# Patient Record
Sex: MALE | Race: WHITE | Employment: OTHER | URBAN - METROPOLITAN AREA
[De-identification: names, ages, dates, MRNs, and addresses within clinical notes are randomized per-mention and may not be internally consistent; named-entity substitution may affect disease eponyms.]

---

## 2018-03-02 ENCOUNTER — OFFICE VISIT (OUTPATIENT)
Dept: SURGERY | Age: 73
End: 2018-03-02

## 2018-03-02 VITALS
HEIGHT: 66 IN | TEMPERATURE: 97.9 F | RESPIRATION RATE: 15 BRPM | WEIGHT: 203 LBS | SYSTOLIC BLOOD PRESSURE: 117 MMHG | BODY MASS INDEX: 32.62 KG/M2 | OXYGEN SATURATION: 98 % | HEART RATE: 90 BPM | DIASTOLIC BLOOD PRESSURE: 73 MMHG

## 2018-03-02 DIAGNOSIS — L72.3 INFECTED SEBACEOUS CYST OF SKIN: ICD-10-CM

## 2018-03-02 DIAGNOSIS — L08.9 INFECTED SEBACEOUS CYST OF SKIN: ICD-10-CM

## 2018-03-02 RX ORDER — WARFARIN 2.5 MG/1
2.5 TABLET ORAL
Status: ON HOLD | COMMUNITY
End: 2020-11-30

## 2018-03-02 RX ORDER — ASPIRIN 81 MG/1
81 TABLET ORAL DAILY
COMMUNITY

## 2018-03-02 NOTE — PROGRESS NOTES
Surgery History and Physical    Subjective:      Tish Payan is a 74 y. o. white male who presents for evaluation of a cyst.  Mr. Rosalinda Briones has had a cyst on his back for years. About 8 months ago, the cyst increased in size. About 2 weeks ago, the cyst became red and painful. He denies any drainage or fever. His blood sugars have been fine. [facility] was [facility] yesterday and started on Doxycycline. He denies any previous skin infections, but believes that he acquired MRSA when he had his bladder surgery. Past Medical History:   Diagnosis Date    Arrhythmia     frequent ectopic beats    Arthritis     Cancer [facility]) 1999    bladder    Carotid atherosclerosis 2/22/2011    Chronic obstructive pulmonary disease (HCC)     Chronic pain     left shoulder    CKD (chronic kidney disease)     Stage 3    GERD (gastroesophageal reflux disease)     Hypercholesterolemia     Hyperlipidemia.     Hypertension     Ill-defined condition     hyperlipidemia    NIDDM (non-insulin dependent diabetes mellitus)     [facility]peripheral artery [facility]) [facility])     Unspecified sleep apnea     non compliant with CPAP     Past Surgical History:   Procedure Laterality Date    CYSTECTOMY,ILEAL CONDUIT/SIGMOID BLADDER  1999    HX CATARACT REMOVAL Bilateral     HX KNEE ARTHROSCOPY Left     repair torn meniscus    HX ORTHOPAEDIC Left     remove heel spur    HX OTHER SURGICAL Left 2011    remove benign growth on neck    HX SHOULDER REPLACEMENT Left 2012    VASCULAR SURGERY PROCEDURE UNLIST Bilateral     BLE stents      Family History   Problem Relation Age of Onset    Heart Disease Mother     Cancer Father      rectal    Cancer Sister      colon    Diabetes Brother      Social History   Substance Use Topics    Smoking status: Former Smoker     Packs/day: 1.00     Years: 40.00     Quit date: 5/4/1999    Smokeless tobacco: Never Used    Alcohol use Yes      Comment: vvery rare beer      Prior to Admission medications Medication Sig Start Date End Date Taking? Authorizing Provider   warfarin (COUMADIN) 2.5 mg tablet Take 2.5 mg by mouth daily. Yes Historical Provider   aspirin delayed-release 81 mg tablet Take  by mouth daily. Yes Historical Provider   BUMETANIDE (BUMEX PO) Take 1 mg by mouth daily. Yes Historical Provider   lisinopril (PRINIVIL, ZESTRIL) 5 mg tablet Take 5 mg by mouth daily. Yes Historical Provider   insulin NPH (HUMULIN N) 100 unit/mL injection 45 Units by SubCUTAneous route nightly. Yes Historical Provider   insulin NPH (HUMULIN N) 100 unit/mL injection 50 Units by SubCUTAneous route Daily (before breakfast). Yes Historical Provider   sodium bicarbonate 650 mg tablet Take 1,950 mg by mouth daily. Yes Historical Provider   Cholecalciferol, Vitamin D3, (VITAMIN D3) 1,000 unit cap Take 1 Cap by mouth daily. Yes Historical Provider   psyllium (METAMUCIL) 0.52 gram capsule Take 2 Caps by mouth daily. Yes Historical Provider   Omeprazole delayed release (PRILOSEC D/R) 20 mg tablet Take 20 mg by mouth daily. Yes Historical Provider   vit B Cmplx 3-FA-Vit C-Biotin (NEPHRO SHIVANI RX) 1- mg-mg-mcg tablet Take 1 Tab by mouth daily. Yes Historical Provider   simvastatin (ZOCOR) 40 mg tablet Take 40 mg by mouth nightly. Yes Historical Provider   INSULIN LISPRO (HUMALOG SC) 15-20 Units by SubCUTAneous route Before breakfast, lunch, and dinner. 15 units morning and noon, 20 units with dinner   Yes Historical Provider   oxyCODONE-acetaminophen (PERCOCET) 5-325 mg per tablet Take 1-2 Tabs by mouth every four (4) hours as needed for Pain. Max Daily Amount: 12 Tabs. 5/13/15   Fernando Walls NP   promethazine (PHENERGAN) 25 mg tablet Take 1 Tab by mouth every six (6) hours as needed for Nausea. 5/13/15   Fernando Walls NP   diazepam (VALIUM) 5 mg tablet Take 1 Tab by mouth every six (6) hours as needed for Anxiety.  Max Daily Amount: 20 mg. 5/13/15   Fernando Walls NP   gabapentin (NEURONTIN) 300 mg capsule Take 300 mg by mouth nightly. Historical Provider   Fenofibrate 54 mg Tab Take 54 mg by mouth daily. Historical Provider      No Known Allergies    Review of Systems:  A comprehensive review of systems was negative except for that written in the History of Present Illness. Objective:      Physical Exam:  GENERAL: alert, cooperative, no distress, appears stated age, EYE: negative findings: anicteric sclera, LYMPHATIC: Cervical, supraclavicular, and axillary nodes normal. , THROAT & NECK: neck supple and symmetrical.  The thyroid is grossly normal., LUNG: clear to auscultation bilaterally, HEART: regular rate and rhythm, ABDOMEN: Soft, NT, ND., EXTREMITIES:  no edema, SKIN: Along the right upper back, there is an approximately 4 x 4 cm raised, firm, tender, well-circumscribed, superficial cystic mass. There is mild erythema, bt no fluctuance or drainage., NEUROLOGIC: negative, PSYCHIATRIC: non focal    Assessment:     Infected sebaceous cyst of the right upper back. Plan:     Mr. Elke De does not appear to have an abscess at this time requiring drainage. He would like to have the cyst removed though. He will need to be off his Coumadin for at least 5 days, and this will need to be discussed with his cardiologist.  I discussed the risks of the procedure including bleeding, persistent infection, wound healing problems, recurrence of the cyst, seroma, and reaction to the prep or local anesthetic. He understands the risks; any and all questions were answered to his satisfaction. Mr. Elke De will be scheduled for an elective outpatient excision of this cyst of the right upper back under MAC with local anesthesia. He will continue his present antibiotics until that time.     Signed By: Blake Lawler MD     March 2, 2018

## 2018-03-02 NOTE — PROGRESS NOTES
1. Have you been to the ER, urgent care clinic since your last visit? Hospitalized since your last visit? [facility]     2. Have you seen or consulted any other health care providers outside of the [address] [place] [address] since your last visit? Include any pap smears or colon screening.  no

## 2018-03-09 ENCOUNTER — TELEPHONE (OUTPATIENT)
Dept: SURGERY | Age: 73
End: 2018-03-09

## 2018-03-09 NOTE — TELEPHONE ENCOUNTER
Called to inform patient I have called to get in contact with Dr Zeke Schmitz since last week, his nurse said he was on vacation but she will call me back with orders for the patient to stop coumadin. Patient said he also called the office and they informed him the Dr will not be back into the office until Monday but they also informed him that I called regarding his coumadin. I will call back the office on Monday. Patient also states his cyst is now open and draining but he is aware Dr Aiden Daugherty will not do an office procedure also until he is off the coumadin for atleast 5 days. He is currently taking antibiotics.

## 2018-03-12 ENCOUNTER — TELEPHONE (OUTPATIENT)
Dept: SURGERY | Age: 73
End: 2018-03-12

## 2018-03-13 ENCOUNTER — OFFICE VISIT (OUTPATIENT)
Dept: SURGERY | Age: 73
End: 2018-03-13

## 2018-03-13 VITALS
SYSTOLIC BLOOD PRESSURE: 118 MMHG | DIASTOLIC BLOOD PRESSURE: 59 MMHG | HEIGHT: 66 IN | WEIGHT: 202 LBS | HEART RATE: 100 BPM | RESPIRATION RATE: 15 BRPM | BODY MASS INDEX: 32.47 KG/M2 | OXYGEN SATURATION: 97 % | TEMPERATURE: 97.9 F

## 2018-03-13 DIAGNOSIS — L72.3 INFECTED SEBACEOUS CYST: ICD-10-CM

## 2018-03-13 DIAGNOSIS — L08.9 INFECTED SEBACEOUS CYST: ICD-10-CM

## 2018-03-13 DIAGNOSIS — L02.212 CUTANEOUS ABSCESS OF BACK EXCLUDING BUTTOCKS: Primary | ICD-10-CM

## 2018-03-13 NOTE — PROGRESS NOTES
Subjective:      Donna Muro is a 74 y. o. white male presents for f/u for an infected cyst.  Mr. Miryam Mata is still having some pain in his back. The cyst has started to drain pus and now feels better. He denies any fever. He has completed his antibiotics. Objective:     Visit Vitals    /59 (BP 1 Location: Left arm, BP Patient Position: Sitting)    Pulse 100    Temp 97.9 °F (36.6 °C) (Oral)    Resp 15    Ht 5' 6\" (1.676 m)    Wt 202 lb (91.6 kg)    SpO2 97%    BMI 32.6 kg/m2       General:  alert, cooperative, no distress   Right upper back: There is an approximately 5 x 4 cm raised, cystic mass with fluctuance, mild erythema and tenderness. There is no drainage. Assessment:     1. Abscess of the right upper back. 2. Infected cyst of the right upper back. Plan:     Mr. Miryam Mata has been cleared by his cardiologist to stop his Coumadin. He will stop te Coumadin tomorrow and be scheduled on Monday, 3/19 for an elective outpatient excision of this cyst and drainage of abscess of the right upper back under MAC with local anesthesia. The risks have already been discussed with him.

## 2018-03-13 NOTE — PROGRESS NOTES
1. Have you been to the ER, urgent care clinic since your last visit? Hospitalized since your last visit?no    2. Have you seen or consulted any other health care providers outside of the [address] [place] [address] since your last visit? Include any pap smears or colon screening.  no

## 2018-03-14 ENCOUNTER — HOSPITAL ENCOUNTER (OUTPATIENT)
Dept: PREADMISSION TESTING | Age: 73
Discharge: HOME | End: 2018-03-14
Payer: MEDICARE

## 2018-03-14 VITALS
SYSTOLIC BLOOD PRESSURE: 136 MMHG | WEIGHT: 205.91 LBS | HEART RATE: 78 BPM | DIASTOLIC BLOOD PRESSURE: 64 MMHG | HEIGHT: 66 IN | RESPIRATION RATE: 16 BRPM | OXYGEN SATURATION: 96 % | BODY MASS INDEX: 33.09 KG/M2 | TEMPERATURE: 97.6 F

## 2018-03-14 LAB
ANION GAP SERPL CALC-SCNC: 13 MMOL/L (ref 5–15)
ATRIAL RATE: 75 BPM
BASOPHILS # BLD: 0.1 K/UL (ref 0–0.1)
BASOPHILS NFR BLD: 1 % (ref 0–1)
BUN SERPL-MCNC: 28 MG/DL (ref 6–20)
BUN/CREAT SERPL: 20 (ref 12–20)
CALCIUM SERPL-MCNC: 8.4 MG/DL (ref 8.5–10.1)
CALCULATED P AXIS, ECG09: 97 DEGREES
CALCULATED R AXIS, ECG10: 71 DEGREES
CALCULATED T AXIS, ECG11: 60 DEGREES
CHLORIDE SERPL-SCNC: 104 MMOL/L (ref 97–108)
CO2 SERPL-SCNC: 26 MMOL/L (ref 21–32)
CREAT SERPL-MCNC: 1.39 MG/DL (ref 0.7–1.3)
DIAGNOSIS, 93000: NORMAL
DIFFERENTIAL METHOD BLD: ABNORMAL
EOSINOPHIL # BLD: 0.1 K/UL (ref 0–0.4)
EOSINOPHIL NFR BLD: 2 % (ref 0–7)
ERYTHROCYTE [DISTWIDTH] IN BLOOD BY AUTOMATED COUNT: 13.5 % (ref 11.5–14.5)
GLUCOSE SERPL-MCNC: 246 MG/DL (ref 65–100)
HCT VFR BLD AUTO: 41.7 % (ref 36.6–50.3)
HGB BLD-MCNC: 13.7 G/DL (ref 12.1–17)
IMM GRANULOCYTES # BLD: 0 K/UL (ref 0–0.04)
IMM GRANULOCYTES NFR BLD AUTO: 1 % (ref 0–0.5)
INR PPP: 3.1 (ref 0.9–1.1)
LYMPHOCYTES # BLD: 1.7 K/UL (ref 0.8–3.5)
LYMPHOCYTES NFR BLD: 25 % (ref 12–49)
MCH RBC QN AUTO: 30.2 PG (ref 26–34)
MCHC RBC AUTO-ENTMCNC: 32.9 G/DL (ref 30–36.5)
MCV RBC AUTO: 92.1 FL (ref 80–99)
MONOCYTES # BLD: 0.4 K/UL (ref 0–1)
MONOCYTES NFR BLD: 5 % (ref 5–13)
NEUTS SEG # BLD: 4.5 K/UL (ref 1.8–8)
NEUTS SEG NFR BLD: 67 % (ref 32–75)
NRBC # BLD: 0 K/UL (ref 0–0.01)
NRBC BLD-RTO: 0 PER 100 WBC
P-R INTERVAL, ECG05: 172 MS
PLATELET # BLD AUTO: 183 K/UL (ref 150–400)
PMV BLD AUTO: 10.1 FL (ref 8.9–12.9)
POTASSIUM SERPL-SCNC: 3.7 MMOL/L (ref 3.5–5.1)
PROTHROMBIN TIME: 32.4 SEC (ref 9–11.1)
Q-T INTERVAL, ECG07: 388 MS
QRS DURATION, ECG06: 90 MS
QTC CALCULATION (BEZET), ECG08: 433 MS
RBC # BLD AUTO: 4.53 M/UL (ref 4.1–5.7)
SODIUM SERPL-SCNC: 143 MMOL/L (ref 136–145)
VENTRICULAR RATE, ECG03: 75 BPM
WBC # BLD AUTO: 6.6 K/UL (ref 4.1–11.1)

## 2018-03-14 PROCEDURE — 93005 ELECTROCARDIOGRAM TRACING: CPT

## 2018-03-14 PROCEDURE — 85610 PROTHROMBIN TIME: CPT | Performed by: SURGERY

## 2018-03-14 PROCEDURE — 36415 COLL VENOUS BLD VENIPUNCTURE: CPT | Performed by: SURGERY

## 2018-03-14 PROCEDURE — 85025 COMPLETE CBC W/AUTO DIFF WBC: CPT | Performed by: SURGERY

## 2018-03-14 PROCEDURE — 80048 BASIC METABOLIC PNL TOTAL CA: CPT | Performed by: SURGERY

## 2018-03-14 RX ORDER — CHOLECALCIFEROL (VITAMIN D3) 125 MCG
1000 CAPSULE ORAL DAILY
COMMUNITY

## 2018-03-14 RX ORDER — SODIUM BICARBONATE 650 MG/1
1300 TABLET ORAL EVERY EVENING
Status: ON HOLD | COMMUNITY
End: 2020-11-30

## 2018-03-14 NOTE — PERIOP NOTES
[address] [facility], [address]    MAIN OR [phone]    MAIN PRE OP [phone]    AMBULATORY PRE OP [phone]    PRE-ADMISSION TESTING [phone]       Surgery Date:   3/19/2018        Is surgery arrival time given by surgeon? NO  If NO, [facility] staff will call you between 3 and 7pm the day before your surgery with your arrival time. (If your surgery is on a Monday, we will call you the Friday before.)    Call [phone] after 7pm Monday-Friday if you did not receive your arrival time. Answers to Common Questions   When You  Arrive   Arrive at the 2nd [address] [facility] on the day of your surgery  Have your insurance card, photo ID, and any copayment (if needed)     Food   and   Drink   NO food or drink after midnight the night before surgery    This means NO water, gum, mints, coffee, juice, etc.  No alcohol (beer, wine, liquor) 24 hours before and after surgery     Medicine to   TAKE   Morning of Surgery   MEDICATIONS TO TAKE THE MORNING OF SURGERY WITH A SIP OF WATER:    HOLD your lisinopril, bumetanide and check with your endocrinologist about managing insulin day before surgery. TAKE the Omeprazole on the morning of surgery with a sip of water.      Medicine  To  STOP   FOR PAIN   NO Aspirin for pain    NO Non-Steroidal Anti-Inflammatory Drugs (NSAIDs:   for example, Ibuprofen (Advil, Motrin), Naproxen (Aleve)   STOP herbal supplements and vitamins 1 week before surgery   You can take Tylenol - follow instructions on the bottle     Blood  Thinners    If you take Aspirin, Plavix, Coumadin, blood-thinning or anti-clot medicine, talk to your surgeon and/or follow the instructions from the doctor who told you to take that medicine     Clothing  Jewelry  Valuables  Bathing     CLOTHING   Wear loose, comfortable clothes   Wear glasses instead of contacts  [employer] money, jewelry and valuables at home   No make-up, particularly mascara, the day of surgery   REMOVE ALL piercings, rings, and jewelry - leave at home   Wear hair loose or down; no pony-tails, buns, or metal hair clips    BATHING   Follow all special bath instructions (for total joint replacement, spine and bowel surgeries.)   If you shower the morning of surgery, please do not apply any lotions, powders, or deodorants afterwards. Do not shave or trim anywhere 24 hours before surgery. Going Home  or  Spending the Night    SAME-DAY SURGERY: You must have a responsible adult drive you home and stay with you 24 hours after surgery   ADMITS: If your doctor is keeping you into the hospital after surgery, leave personal belongings/luggage in your car until you have a hospital room number. Hospital discharge time is 12 noon  Drivers must be here before 12 noon unless you are told differently         Follow all instructions so your surgery wont be cancelled. Please, be on time. If a situation occurs and you are delayed the day of surgery, call [phone] or [phone]. If your physical condition changes (like a fever, cold, flu, etc.) call your surgeon as soon as possible. The Preadmission Testing staff can be reached at [phone]. OTHER SPECIAL INSTRUCTIONS:  Get some glucose tablets to have on hand at home for when you have low blood sugars. Free  parking 7-5    The patient was contacted  in person. He  verbalize  understanding of all instructions and does not  need reinforcement.

## 2018-03-15 ENCOUNTER — ANESTHESIA EVENT (OUTPATIENT)
Dept: SURGERY | Age: 73
End: 2018-03-15
Payer: MEDICARE

## 2018-03-15 LAB
BACTERIA SPEC CULT: NORMAL
BACTERIA SPEC CULT: NORMAL
SERVICE CMNT-IMP: NORMAL

## 2018-03-19 ENCOUNTER — HOSPITAL ENCOUNTER (OUTPATIENT)
Age: 73
Setting detail: OUTPATIENT SURGERY
Discharge: HOME | End: 2018-03-19
Attending: SURGERY | Admitting: SURGERY
Payer: MEDICARE

## 2018-03-19 ENCOUNTER — ANESTHESIA (OUTPATIENT)
Dept: SURGERY | Age: 73
End: 2018-03-19
Payer: MEDICARE

## 2018-03-19 VITALS
RESPIRATION RATE: 15 BRPM | DIASTOLIC BLOOD PRESSURE: 74 MMHG | TEMPERATURE: 97.6 F | HEART RATE: 68 BPM | OXYGEN SATURATION: 97 % | SYSTOLIC BLOOD PRESSURE: 112 MMHG

## 2018-03-19 DIAGNOSIS — L02.212 CUTANEOUS ABSCESS OF BACK EXCLUDING BUTTOCKS: Primary | ICD-10-CM

## 2018-03-19 DIAGNOSIS — L08.9 INFECTED SEBACEOUS CYST OF SKIN: ICD-10-CM

## 2018-03-19 DIAGNOSIS — L72.3 INFECTED SEBACEOUS CYST OF SKIN: ICD-10-CM

## 2018-03-19 LAB
GLUCOSE BLD STRIP.AUTO-MCNC: 197 MG/DL (ref 65–100)
GLUCOSE BLD STRIP.AUTO-MCNC: 210 MG/DL (ref 65–100)
INR PPP: 1.3 (ref 0.9–1.1)
PROTHROMBIN TIME: 13.6 SEC (ref 9–11.1)
SERVICE CMNT-IMP: ABNORMAL
SERVICE CMNT-IMP: ABNORMAL

## 2018-03-19 PROCEDURE — 74011000250 HC RX REV CODE- 250: Performed by: SURGERY

## 2018-03-19 PROCEDURE — 77030018836 HC SOL IRR NACL ICUM -A: Performed by: SURGERY

## 2018-03-19 PROCEDURE — 74011250636 HC RX REV CODE- 250/636: Performed by: ANESTHESIOLOGY

## 2018-03-19 PROCEDURE — 77030031139 HC SUT VCRL2 J&J -A: Performed by: SURGERY

## 2018-03-19 PROCEDURE — 74011250636 HC RX REV CODE- 250/636

## 2018-03-19 PROCEDURE — 77030002916 HC SUT ETHLN J&J -A: Performed by: SURGERY

## 2018-03-19 PROCEDURE — 76210000022 HC REC RM PH II 1.5 TO 2 HR: Performed by: SURGERY

## 2018-03-19 PROCEDURE — 82962 GLUCOSE BLOOD TEST: CPT

## 2018-03-19 PROCEDURE — 76060000032 HC ANESTHESIA 0.5 TO 1 HR: Performed by: SURGERY

## 2018-03-19 PROCEDURE — 85610 PROTHROMBIN TIME: CPT | Performed by: SURGERY

## 2018-03-19 PROCEDURE — 36415 COLL VENOUS BLD VENIPUNCTURE: CPT | Performed by: SURGERY

## 2018-03-19 PROCEDURE — 77030002933 HC SUT MCRYL J&J -A: Performed by: SURGERY

## 2018-03-19 PROCEDURE — 76010000138 HC OR TIME 0.5 TO 1 HR: Performed by: SURGERY

## 2018-03-19 PROCEDURE — 77030032490 HC SLV COMPR SCD KNE COVD -B: Performed by: SURGERY

## 2018-03-19 PROCEDURE — 77030011640 HC PAD GRND REM COVD -A: Performed by: SURGERY

## 2018-03-19 PROCEDURE — 88304 TISSUE EXAM BY PATHOLOGIST: CPT | Performed by: SURGERY

## 2018-03-19 RX ORDER — SODIUM CHLORIDE 0.9 % (FLUSH) 0.9 %
5-10 SYRINGE (ML) INJECTION AS NEEDED
Status: DISCONTINUED | OUTPATIENT
Start: 2018-03-19 | End: 2018-03-19 | Stop reason: HOSPADM

## 2018-03-19 RX ORDER — PROPOFOL 10 MG/ML
INJECTION, EMULSION INTRAVENOUS
Status: DISCONTINUED | OUTPATIENT
Start: 2018-03-19 | End: 2018-03-19 | Stop reason: HOSPADM

## 2018-03-19 RX ORDER — HYDROMORPHONE HYDROCHLORIDE 1 MG/ML
.25-1 INJECTION, SOLUTION INTRAMUSCULAR; INTRAVENOUS; SUBCUTANEOUS
Status: DISCONTINUED | OUTPATIENT
Start: 2018-03-19 | End: 2018-03-19 | Stop reason: HOSPADM

## 2018-03-19 RX ORDER — LIDOCAINE HYDROCHLORIDE 10 MG/ML
0.1 INJECTION, SOLUTION EPIDURAL; INFILTRATION; INTRACAUDAL; PERINEURAL AS NEEDED
Status: DISCONTINUED | OUTPATIENT
Start: 2018-03-19 | End: 2018-03-19 | Stop reason: HOSPADM

## 2018-03-19 RX ORDER — SODIUM CHLORIDE 0.9 % (FLUSH) 0.9 %
5-10 SYRINGE (ML) INJECTION EVERY 8 HOURS
Status: DISCONTINUED | OUTPATIENT
Start: 2018-03-19 | End: 2018-03-19 | Stop reason: HOSPADM

## 2018-03-19 RX ORDER — FENTANYL CITRATE 50 UG/ML
INJECTION, SOLUTION INTRAMUSCULAR; INTRAVENOUS AS NEEDED
Status: DISCONTINUED | OUTPATIENT
Start: 2018-03-19 | End: 2018-03-19 | Stop reason: HOSPADM

## 2018-03-19 RX ORDER — PROPOFOL 10 MG/ML
INJECTION, EMULSION INTRAVENOUS AS NEEDED
Status: DISCONTINUED | OUTPATIENT
Start: 2018-03-19 | End: 2018-03-19 | Stop reason: HOSPADM

## 2018-03-19 RX ORDER — SODIUM CHLORIDE, SODIUM LACTATE, POTASSIUM CHLORIDE, CALCIUM CHLORIDE 600; 310; 30; 20 MG/100ML; MG/100ML; MG/100ML; MG/100ML
100 INJECTION, SOLUTION INTRAVENOUS CONTINUOUS
Status: DISCONTINUED | OUTPATIENT
Start: 2018-03-19 | End: 2018-03-19 | Stop reason: HOSPADM

## 2018-03-19 RX ORDER — OXYCODONE AND ACETAMINOPHEN 5; 325 MG/1; MG/1
1 TABLET ORAL
Qty: 10 TAB | Refills: 0 | Status: SHIPPED | OUTPATIENT
Start: 2018-03-19 | End: 2020-12-02 | Stop reason: ALTCHOICE

## 2018-03-19 RX ORDER — MIDAZOLAM HYDROCHLORIDE 1 MG/ML
INJECTION, SOLUTION INTRAMUSCULAR; INTRAVENOUS AS NEEDED
Status: DISCONTINUED | OUTPATIENT
Start: 2018-03-19 | End: 2018-03-19 | Stop reason: HOSPADM

## 2018-03-19 RX ADMIN — PROPOFOL 10 MG: 10 INJECTION, EMULSION INTRAVENOUS at 12:41

## 2018-03-19 RX ADMIN — MIDAZOLAM HYDROCHLORIDE 0.5 MG: 1 INJECTION, SOLUTION INTRAMUSCULAR; INTRAVENOUS at 12:22

## 2018-03-19 RX ADMIN — PROPOFOL 10 MG: 10 INJECTION, EMULSION INTRAVENOUS at 12:55

## 2018-03-19 RX ADMIN — FENTANYL CITRATE 25 MCG: 50 INJECTION, SOLUTION INTRAMUSCULAR; INTRAVENOUS at 12:36

## 2018-03-19 RX ADMIN — PROPOFOL 50 MCG/KG/MIN: 10 INJECTION, EMULSION INTRAVENOUS at 12:29

## 2018-03-19 RX ADMIN — SODIUM CHLORIDE, SODIUM LACTATE, POTASSIUM CHLORIDE, AND CALCIUM CHLORIDE 100 ML/HR: 600; 310; 30; 20 INJECTION, SOLUTION INTRAVENOUS at 11:11

## 2018-03-19 NOTE — OP NOTES
Operative Report        Otoniel Jiménez    MRN:  [id number]    Date of Procedure:  3/19/2018    Surgeon:  Chandrakant Bowen MD.     Assistant:   Eduardo Frost MD.    Anesthesia:   1. MAC.  2. 1% Xylocaine with Epinephrine and 0.5% Marcaine. Preoperative Diagnosis:   1. INFECTED SEBACEOUS CYST OF THE UPPER BACK. 2. ABSCESS OF THE UPPER BACK. Postoperative Diagnosis:    1. INFECTED SEBACEOUS CYST OF THE UPPER BACK. 2. ABSCESS OF THE UPPER BACK. Procedures:  1. Incision and drainage of abscess of the upper back. 2. Excision of sebaceous cyst of the upper back. Indication:   Otoniel Jiménez is a 74 yrs white male who presents with an infected cyst of the of the upper back has developed into an abscess. The abscess is now draining spontaneously. He is off his Coumadin and would like to have it removed. Procedure in Detail:   The patient was seen preoperatively in the holding area. The risks, benefits, and expected outcome were discussed with the patient, and all questions were answered satisfactorily. The patient concurred with the proposed plan, giving informed consent. The ccyst was identified by the patient and confirmed by me. The cyst was then marked. The patient was taken to the OR. The patient was identified as Ramesh Richardson, and the procedure verified as Procedure(s):  EXCISION OF CYST RIGHT UPPER BACK . The patient was placed on the OR table in the left lateral decubitus position. MAC anesthesia was administered and tolerated well. The patient's upper back was prepped with Betadine and draped in the usual sterile fashion. A Time Out was performed, and the above information was confirmed. The cyst was palpated and remarked. The local anesthetic was infiltrated into the skin and subcutaneous tissue.   A transverse elliptical incision was made directly over the most prominent portion of the cyst.  Dissection was taken down into the subcutaneous fat with a #15 blade, and a small amount of pus drained out. A cyst was discovered as well. The cyst was dissected out in its entirety with cautery and passed off as a specimen. No other masses were palpated within the wound. Hemostasis was obtained within the wound as needed with cautery. The wound was irrigated with saline. The wound was packed with 1/2\" Iodoform gauze. The skin was cleaned and dried, and a sterile dressing was applied. Estimated Blood Loss:    Less than 25 ml. Specimen:     ID Type Source Tests Collected by Time Destination   1 : UPPER BACK CYST Preservative Back  Amie Lai MD 3/19/2018 1302 Pathology        Drain:   * No implants in log *    Findings:   1. A small abscess cavity in the upper back adjacent to the cyst.  2. An approximately 4.5 x 4.5 cm subcutaneous cyst in the upper back. Counts: All sponge, needle, and instrument counts were correct x 2. Complications:  None. Disposition:  The patient was transferred to the recovery room in stable condition, having tolerated the procedure and anesthesia well.                Signed By: Amie Lai MD     March 19, 2018        Ingrid Sierra MD

## 2018-03-19 NOTE — IP AVS SNAPSHOT
[address] 
[phone] Patient: Tyree Vargas MRN: [id number] OXB:[birth year] About your hospitalization You were admitted on:  March 19, 2018 You last received care in the:  [facility] PACU You were discharged on:  March 19, 2018 Why you were hospitalized Your primary diagnosis was:  Not on File Follow-up Information Follow up With Details Comments Contact Info Carson Garcia MD   [address] [facility] [address] [place] [address] 
[phone] Your Scheduled Appointments Monday April 02, 2018  9:00 AM EDT  
POST OP with Benjamin Spicer MD  
[address] [facility] [facility]) [address] [place] [address]  
[phone] Discharge Orders None A check ajay indicates which time of day the medication should be taken. My Medications START taking these medications Instructions Each Dose to Equal  
 Morning Noon Evening Bedtime  
 oxyCODONE-acetaminophen 5-325 mg per tablet Commonly known as:  PERCOCET Your last dose was: Your next dose is: Take 1 Tab by mouth every four (4) hours as needed for Pain. Max Daily Amount: 6 Tabs. 1 Tab CONTINUE taking these medications Instructions Each Dose to Equal  
 Morning Noon Evening Bedtime  
 aspirin delayed-release 81 mg tablet Your last dose was: Your next dose is: Take  by mouth daily. BUMEX PO Your last dose was: Your next dose is: Take 1 mg by mouth daily. 1 mg  
    
   
   
   
  
 cholecalciferol (vitamin D3) 2,000 unit Tab Your last dose was: Your next dose is: Take  by mouth daily. HUMALOG SC Your last dose was:     
   
Your next dose is:    
   
   
 15-20 Units by SubCUTAneous route Before breakfast, lunch, and dinner. 15 units morning and noon, 20 units with dinner 15-20 Units HumuLIN N NPH U-100 Insulin 100 unit/mL injection Generic drug:  insulin NPH Your last dose was: Your next dose is:    
   
   
 by SubCUTAneous route Before breakfast and dinner. Morning 50 units, Evening 45 units  
     
   
   
   
  
 lisinopril 5 mg tablet Commonly known as:  Venessa Doan Your last dose was: Your next dose is: Take 5 mg by mouth daily. 5 mg METAMUCIL PO Your last dose was: Your next dose is: Take 3 Caps by mouth daily. 3 Cap Omeprazole delayed release 20 mg tablet Commonly known as:  PRILOSEC D/R Your last dose was: Your next dose is: Take 20 mg by mouth daily. 20 mg  
    
   
   
   
  
 simvastatin 40 mg tablet Commonly known as:  ZOCOR Your last dose was: Your next dose is: Take 40 mg by mouth nightly. 40 mg  
    
   
   
   
  
 * sodium bicarbonate 650 mg tablet Your last dose was: Your next dose is: Take 1,950 mg by mouth every morning. 1950 mg  
    
   
   
   
  
 * sodium bicarbonate 650 mg tablet Your last dose was: Your next dose is: Take 1,300 mg by mouth every evening. 1300 mg  
    
   
   
   
  
 warfarin 2.5 mg tablet Commonly known as:  COUMADIN Your last dose was: Your next dose is: Take 2.5 mg by mouth. Monday-Saturday 2.5 mg  
    
   
   
   
  
 * Notice: This list has 2 medication(s) that are the same as other medications prescribed for you. Read the directions carefully, and ask your doctor or other care provider to review them with you. Where to Get Your Medications Information on where to get these meds will be given to you by the nurse or doctor. ! Ask your nurse or doctor about these medications  
  oxyCODONE-acetaminophen 5-325 mg per tablet Discharge Instructions Instructions Following Excision of Cyst, Soft Tissue Mass Activity: · No quick, jerking or stretching movement with your back. · May shower tomorrow. No bath for 2 weeks and until after seen by your doctor. Diet: 
· Advance to regular diet as tolerated. Pain: · Take pain medication as directed by your doctor. · Call your doctor if pain is NOT relieved by medication. · DO NOT take blood thinners until directed by your doctor. Dressing Care:  
Remove your bandage on Tuesday, 3/20. Pack your wound daily with 1/2 \" Iodoform gauze, then cover the wound with dry 4 x 4 gauze. Do this until the wound is healed. Follow-Up Phone Calls: 
· Call will be made by my nursing staff tomorrow. · If you have any problems or concerns, call your doctor as needed. Call your doctor if you experience: 
· Excessive bleeding that does not stop after holding mild pressure over the area. · Temperature of 101° Fahrenheit or above. · Redness, excessive swelling or bruising, and/or green or yellow, smelly discharge from incision. After Anesthesia: 
· For the first 24 hours and while taking narcotic pain medication: DO NOT drive, drink alcoholic beverages, or make important decisions. · Be aware of dizziness following anesthesia and while taking pain medication. DISCHARGE SUMMARY from your Nurse The following personal items collected during your admission are returned to you:  
Dental Appliance: Dental Appliances: Uppers, Other (comment) (missing teeth bottom front) Vision: Visual Aid: Glasses Hearing Aid:   
Jewelry: Jewelry: None Clothing: Clothing: Other (comment) (clothes to locker) Other Valuables: Other Valuables: Eyeglasses, Other (comment) (dentures and eyeglasses given to wife) Valuables sent to safe:   
 
PATIENT INSTRUCTIONS: 
 After general anesthesia or intravenous sedation, for 24 hours or while taking prescription Narcotics: · Limit your activities · Do not drive and operate hazardous machinery · Do not make important personal or business decisions · Do  not drink alcoholic beverages · If you have not urinated within 8 hours after discharge, please contact your surgeon on call. Report the following to your surgeon: 
· Excessive pain, swelling, redness or odor of or around the surgical area · Temperature over 100.5 · Nausea and vomiting lasting longer than 4 hours or if unable to take medications · Any signs of decreased circulation or nerve impairment to extremity: change in color, persistent  numbness, tingling, coldness or increase pain · Any questions [address] Breathing deep and coughing are very important exercises to do after surgery. Deep breathing and coughing open the little air tubes and air sacks in your lungs. You take deep breaths every day. You may not even notice - it is just something you do when you sigh or yawn. It is a natural exercise you do to keep these air passages open. After surgery, take deep breaths and cough, on purpose. Coughing and deep breathing help prevent bronchitis and pneumonia after surgery. If you had chest or belly surgery, use a pillow as a \"hug buddy\" and hold it tightly to your chest or belly when you cough. DIRECTIONS: 
6. Take 10 to 15 slow deep breaths every hour while awake. 7. Breathe in deeply, and hold it for 2 seconds. 8. Exhale slowly through puckered lips, like blowing up a balloon. 9. After every 4th or 5th deep breath, hug your pillow to your chest or belly and give a hard, deep cough. Yes, it will probably hurt. But doing this exercise is very important part of healing after surgery. Take your pain medicine to help you do this exercise without too much pain.  
 
IF YOU HAVE BEEN DIAGNOSED WITH SLEEP APNEA, PLEASE USE YOUR SLEEP APNEA DEVICE OR CPAP MACHINE WHEN YOU INTEND TO NAP AFTER TAKING PAIN MEDICATION. Ankle Pumps Ankle pumps increase the circulation of oxygenated blood to your lower extremities and decrease your risk for circulation problems such as blood clots. They also stretch the muscles, tendons and ligaments in your foot and ankle, and prevent joint contracture in the ankle and foot, especially after surgeries on the legs. It is important to do ankle pump exercises regularly after surgery because immobility increases your risk for developing a blood clot. Your doctor may also have you take an Aspirin for the next few days as well. If your doctor did not ask you to take an Aspirin, consult with him before starting Aspirin therapy on your own. Slowly point your foot forward, feeling the muscles on the top of your lower leg stretch, and hold this position for 5 seconds. Next, pull your foot back toward you as far as possible, stretching the calf muscles, and hold that position for 5 seconds. Repeat with the other foot. Perform 10 repetitions every hour while awake for both ankles if possible (down and then up with the foot once is one repetition). You should feel gentle stretching of the muscles in your lower leg when doing this exercise. If you feel pain, or your range of motion is limited, don't  Push too hard. Only go the limit your joint and muscles will let you go. If you have increasing pain, progressively worsening leg warmth or swelling, STOP the exercise and call your doctor. Below is information about the medications your doctor is prescribing after your visit: 
 
Other information in your discharge envelope: 
[]     PRESCRIPTIONS []     PHYSICAL THERAPY PRESCRIPTION 
[]     APPOINTMENT CARDS []     Regional Anesthesia Pamphlet for block or block with On-Q Catheter from Anesthesia Service []     Medical device information sheets/pamphlets from their   
[]     School/work excuse note. []     /parent work excuse note. These are general instructions for a healthy lifestyle: *  Please give a list of your current medications to your Primary Care Provider. *  Please update this list whenever your medications are discontinued, doses are 
    changed, or new medications (including over-the-counter products) are added. *  Please carry medication information at all times in case of emergency situations. About Smoking No smoking / No tobacco products / Avoid exposure to second hand smoke Surgeon General's Warning:  Quitting smoking now greatly reduces serious risk to your health. Obesity, smoking, and sedentary lifestyle greatly increases your risk for illness and disease. A healthy diet, regular physical exercise & weight monitoring are important for maintaining a healthy lifestyle. Congestive Heart Failure You may be retaining fluid if you have a history of heart failure or if you experience any of the following symptoms:  Weight gain of 3 pounds or more overnight or 5 pounds in a week, increased swelling in our hands or feet or shortness of breath while lying flat in bed. Please call your doctor as soon as you notice any of these symptoms; do not wait until your next office visit. Recognize signs and symptoms of STROKE: 
F - face looks uneven A - arms unable to move or move even S - speech slurred or non-existent T - time-call 911 as soon as signs and symptoms begin-DO NOT go Back to bed or wait to see if you get better-TIME IS BRAIN. Warning signs of HEART ATTACK Call 911 if you have these symptoms · Chest discomfort. Most heart attacks involve discomfort in the center of the chest that lasts more than a few minutes, or that goes away and comes back. It can feel like uncomfortable pressure, squeezing, fullness, or pain. · Discomfort in other areas of the upper body.  
     Symptoms can include pain or discomfort in one or both Arms, the back, neck, jaw, or stomach. ·  Shortness of breath with or without chest discomfort. · Other signs may include breaking out in a cold sweat, nausea, or lightheadedness Don't wait more than five minutes to call 911 - MINUTES MATTER! Fast action can save your life. Calling 911 is almost always the fastest way to get lifesaving treatment. Emergency Medical Services staff can begin treatment when they arrive - up to an hour sooner than if someone gets to the hospital by car. BENITO ALVA MEDICATION AND SIDE EFFECT GUIDE The [employer] 55 EFFECT GUIDE was provided to the PATIENT AND CARE PROVIDER. Information provided includes instruction about drug purpose and common side effects for the following medications: 
 
· Percocet Debborclover Fitzwilliam. Ariana Vasquez MD, [facility] General Surgery at [address] [place] [address], [address] [place] [address] 
[phone] Fax [phone] Introducing [facility]! [employer] introduces My 1% patient portal. Now you can access parts of your medical record, email your doctor's office, and request medication refills online. 1. In your internet browser, go to [email or web address] [email or web address] 2. Click on the First Time User? Click Here link in the Sign In box. You will see the New Member Sign Up page. 3. Enter your My 1% Access Code exactly as it appears below. You will not need to use this code after youve completed the sign-up process. If you do not sign up before the expiration date, you must request a new code. · [employer] Access Code: [email or web address] Expires: 6/11/2018 12:39 PM 
 
4. Enter the last four digits of your Social Security Number (xxxx) and Date of Birth (mm/dd/yyyy) as indicated and click Submit. You will be taken to the next sign-up page. 5. Create a My 1% ID.  This will be your My 1% login ID and cannot be changed, so think of one that is secure and easy to remember. 6. Create a RescueTime password. You can change your password at any time. 7. Enter your Password Reset Question and Answer. This can be used at a later time if you forget your password. 8. Enter your e-mail address. You will receive e-mail notification when new information is available in [address] 9. Click Sign Up. You can now view and download portions of your medical record. 10. Click the Download Summary menu link to download a portable copy of your medical information. If you have questions, please visit the Frequently Asked Questions section of the RescueTime website. Remember, RescueTime is NOT to be used for urgent needs. For medical emergencies, dial 911. Now available from your iPhone and Android! Providers Seen During Your Hospitalization Provider Specialty Primary office phone Lin Isbell MD Surgery [phone] Your Primary Care Physician (PCP) Primary Care Physician Office Phone Office Fax Kb Cadet [phone] You are allergic to the following No active allergies Recent Documentation Smoking Status Former Smoker Emergency Contacts Name Discharge Info Relation Home Work Mobile Charito Richardson DISCHARGE CAREGIVER [3] Spouse [3] [phone] Patient Belongings The following personal items are in your possession at time of discharge: 
  Dental Appliances: Uppers, Other (comment) (missing teeth bottom front)  Visual Aid: Glasses      Home Medications: None   Jewelry: None  Clothing: Other (comment) (clothes to locker)    Other Valuables: Eyeglasses, Other (comment) (dentures and eyeglasses given to wife) Please provide this summary of care documentation to your next provider.  
  
  
 
  
Signatures-by signing, you are acknowledging that this After Visit Summary has been reviewed with you and you have received a copy. Patient Signature:  ____________________________________________________________ Date:  ____________________________________________________________  
  
Marisol Deiters Provider Signature:  ____________________________________________________________ Date:  ____________________________________________________________

## 2018-03-19 NOTE — PERIOP NOTES
Pt. Fall protocol  Yellow armband on patient, yellow non skid socks on  Bed in low position, all side rails up, call bell in reach  Pt. And family instructed in \"call don't fall\" protocol  Use your call bell and wait for assistance, staff not family will assist you to get up and move about  Pt. And family verbalize understanding of fall precautions and \"call don't fall\" protocol    10:31 AM  Pt. Had two negative MRSA swabs, one in 2015 and one a week ago in [facility]. Pt. Had history of MRSA back in 1999.

## 2018-03-19 NOTE — ANESTHESIA PREPROCEDURE EVALUATION
Anesthetic History No history of anesthetic complications Review of Systems / Medical History Patient summary reviewed, nursing notes reviewed and pertinent labs reviewed Pulmonary COPD: mild Sleep apnea: No treatment Smoker Comments: Former smoker - quit 1999 COPD - mild, not on any inhalers ERASMO- however non compliant with CPAP Neuro/Psych Within defined limits Cardiovascular Hypertension CHF Dysrhythmias : atrial fibrillation CAD Exercise tolerance: <4 METS Comments: Last saw cards 2/23 - Angiogram 2/2018 - showed mild disease 
lexiscan stress 2/2018 - inferior mixed with some reversability, EF 56% Non-ischemic CM - EF 50% PAD s/p B iliac stents HTN A fib, last took coumadin 5 days ago (INR pending), not on a BB Pt endorses 1 FOS however limited activity due to knee pain GI/Hepatic/Renal 
  
GERD: well controlled Renal disease: CRI Comments: H/o bladder CA 
CRI - cr 1.4 Endo/Other Diabetes: well controlled, type 1 Arthritis and cancer Comments: Acc 197 Bladder CA Other Findings Comments:  - insulin held this am  
 
 
 
 
Physical Exam 
 
Airway Mallampati: II 
TM Distance: 4 - 6 cm Neck ROM: normal range of motion Mouth opening: Normal 
 
 Cardiovascular Rhythm: irregular Rate: normal 
 
 
 
 Dental 
 
Dentition: Poor dentition Comments: 4 lower teeth remaining Pulmonary Breath sounds clear to auscultation Abdominal 
 
 
 
 Other Findings Anesthetic Plan ASA: 3 Anesthesia type: MAC Induction: Intravenous Anesthetic plan and risks discussed with: Patient

## 2018-03-19 NOTE — IP AVS SNAPSHOT
[address] [place] [phone] Patient: Domenica Licea MRN: [id number] EBF:[birth year] A check ajay indicates which time of day the medication should be taken. My Medications START taking these medications Instructions Each Dose to Equal  
 Morning Noon Evening Bedtime  
 oxyCODONE-acetaminophen 5-325 mg per tablet Commonly known as:  PERCOCET Your last dose was: Your next dose is: Take 1 Tab by mouth every four (4) hours as needed for Pain. Max Daily Amount: 6 Tabs. 1 Tab CONTINUE taking these medications Instructions Each Dose to Equal  
 Morning Noon Evening Bedtime  
 aspirin delayed-release 81 mg tablet Your last dose was: Your next dose is: Take  by mouth daily. BUMEX PO Your last dose was: Your next dose is: Take 1 mg by mouth daily. 1 mg  
    
   
   
   
  
 cholecalciferol (vitamin D3) 2,000 unit Tab Your last dose was: Your next dose is: Take  by mouth daily. HUMALOG SC Your last dose was: Your next dose is:    
   
   
 15-20 Units by SubCUTAneous route Before breakfast, lunch, and dinner. 15 units morning and noon, 20 units with dinner 15-20 Units HumuLIN N NPH U-100 Insulin 100 unit/mL injection Generic drug:  insulin NPH Your last dose was: Your next dose is:    
   
   
 by SubCUTAneous route Before breakfast and dinner. Morning 50 units, Evening 45 units  
     
   
   
   
  
 lisinopril 5 mg tablet Commonly known as:  Rheta Tana Your last dose was: Your next dose is: Take 5 mg by mouth daily. 5 mg METAMUCIL PO Your last dose was: Your next dose is:     
   
   
 Take 3 Caps by mouth daily. 3 Cap Omeprazole delayed release 20 mg tablet Commonly known as:  PRILOSEC D/R Your last dose was: Your next dose is: Take 20 mg by mouth daily. 20 mg  
    
   
   
   
  
 simvastatin 40 mg tablet Commonly known as:  ZOCOR Your last dose was: Your next dose is: Take 40 mg by mouth nightly. 40 mg  
    
   
   
   
  
 * sodium bicarbonate 650 mg tablet Your last dose was: Your next dose is: Take 1,950 mg by mouth every morning. 1950 mg  
    
   
   
   
  
 * sodium bicarbonate 650 mg tablet Your last dose was: Your next dose is: Take 1,300 mg by mouth every evening. 1300 mg  
    
   
   
   
  
 warfarin 2.5 mg tablet Commonly known as:  COUMADIN Your last dose was: Your next dose is: Take 2.5 mg by mouth. Monday-Saturday 2.5 mg  
    
   
   
   
  
 * Notice: This list has 2 medication(s) that are the same as other medications prescribed for you. Read the directions carefully, and ask your doctor or other care provider to review them with you. Where to Get Your Medications Information on where to get these meds will be given to you by the nurse or doctor. ! Ask your nurse or doctor about these medications  
  oxyCODONE-acetaminophen 5-325 mg per tablet

## 2018-03-19 NOTE — DISCHARGE INSTRUCTIONS
Instructions Following Excision of Cyst, Soft Tissue Mass    Activity:  · No quick, jerking or stretching movement with your back. · May shower tomorrow. No bath for 2 weeks and until after seen by your doctor. Diet:  · Advance to regular diet as tolerated. Pain:  · Take pain medication as directed by your doctor. · Call your doctor if pain is NOT relieved by medication. · DO NOT take blood thinners until directed by your doctor. Dressing Care:   Remove your bandage on Tuesday, 3/20. Pack your wound daily with 1/2 \" Iodoform gauze, then cover the wound with dry 4 x 4 gauze. Do this until the wound is healed. Follow-Up Phone Calls:  · Call will be made by my nursing staff tomorrow. · If you have any problems or concerns, call your doctor as needed. Call your doctor if you experience:  · Excessive bleeding that does not stop after holding mild pressure over the area. · Temperature of 101° Fahrenheit or above. · Redness, excessive swelling or bruising, and/or green or yellow, smelly discharge from incision. After Anesthesia:  · For the first 24 hours and while taking narcotic pain medication: DO NOT drive, drink alcoholic beverages, or make important decisions. · Be aware of dizziness following anesthesia and while taking pain medication. DISCHARGE SUMMARY from your Nurse    The following personal items collected during your admission are returned to you:   Dental Appliance: Dental Appliances: Uppers, Other (comment) (missing teeth bottom front)  Vision: Visual Aid: Glasses  Hearing Aid:    Jewelry: Jewelry: None  Clothing: Clothing: Other (comment) (clothes to locker)  Other Valuables:  Other Valuables: Eyeglasses, Other (comment) (dentures and eyeglasses given to wife)  Valuables sent to safe:      PATIENT INSTRUCTIONS:    After general anesthesia or intravenous sedation, for 24 hours or while taking prescription Narcotics:  · Limit your activities  · Do not drive and operate hazardous machinery  · Do not make important personal or business decisions  · Do  not drink alcoholic beverages  · If you have not urinated within 8 hours after discharge, please contact your surgeon on call. Report the following to your surgeon:  · Excessive pain, swelling, redness or odor of or around the surgical area  · Temperature over 100.5  · Nausea and vomiting lasting longer than 4 hours or if unable to take medications  · Any signs of decreased circulation or nerve impairment to extremity: change in color, persistent  numbness, tingling, coldness or increase pain  · Any questions    COUGH AND DEEP BREATHE    Breathing deep and coughing are very important exercises to do after surgery. Deep breathing and coughing open the little air tubes and air sacks in your lungs. You take deep breaths every day. You may not even notice - it is just something you do when you sigh or yawn. It is a natural exercise you do to keep these air passages open. After surgery, take deep breaths and cough, on purpose. Coughing and deep breathing help prevent bronchitis and pneumonia after surgery. If you had chest or belly surgery, use a pillow as a \"hug buddy\" and hold it tightly to your chest or belly when you cough. DIRECTIONS:  6. Take 10 to 15 slow deep breaths every hour while awake. 7. Breathe in deeply, and hold it for 2 seconds. 8. Exhale slowly through puckered lips, like blowing up a balloon. 9. After every 4th or 5th deep breath, hug your pillow to your chest or belly and give a hard, deep cough. Yes, it will probably hurt. But doing this exercise is very important part of healing after surgery. Take your pain medicine to help you do this exercise without too much pain. IF YOU HAVE BEEN DIAGNOSED WITH SLEEP APNEA, PLEASE USE YOUR SLEEP APNEA DEVICE OR CPAP MACHINE WHEN YOU INTEND TO NAP AFTER TAKING PAIN MEDICATION.     Ankle Pumps    Ankle pumps increase the circulation of oxygenated blood to your lower extremities and decrease your risk for circulation problems such as blood clots. They also stretch the muscles, tendons and ligaments in your foot and ankle, and prevent joint contracture in the ankle and foot, especially after surgeries on the legs. It is important to do ankle pump exercises regularly after surgery because immobility increases your risk for developing a blood clot. Your doctor may also have you take an Aspirin for the next few days as well. If your doctor did not ask you to take an Aspirin, consult with him before starting Aspirin therapy on your own. Slowly point your foot forward, feeling the muscles on the top of your lower leg stretch, and hold this position for 5 seconds. Next, pull your foot back toward you as far as possible, stretching the calf muscles, and hold that position for 5 seconds. Repeat with the other foot. Perform 10 repetitions every hour while awake for both ankles if possible (down and then up with the foot once is one repetition). You should feel gentle stretching of the muscles in your lower leg when doing this exercise. If you feel pain, or your range of motion is limited, don't  Push too hard. Only go the limit your joint and muscles will let you go. If you have increasing pain, progressively worsening leg warmth or swelling, STOP the exercise and call your doctor. Below is information about the medications your doctor is prescribing after your visit:    Other information in your discharge envelope:  []     PRESCRIPTIONS  []     PHYSICAL THERAPY PRESCRIPTION  []     APPOINTMENT CARDS  []     Regional Anesthesia Pamphlet for block or block with On-Q Catheter from Anesthesia Service  []     Medical device information sheets/pamphlets from their    []     School/work excuse note. []     /parent work excuse note.       These are general instructions for a healthy lifestyle:    *  Please give a list of your current medications to your Primary Care Provider. *  Please update this list whenever your medications are discontinued, doses are      changed, or new medications (including over-the-counter products) are added. *  Please carry medication information at all times in case of emergency situations. About Smoking  No smoking / No tobacco products / Avoid exposure to second hand smoke    Surgeon General's Warning:  Quitting smoking now greatly reduces serious risk to your health. Obesity, smoking, and sedentary lifestyle greatly increases your risk for illness and disease. A healthy diet, regular physical exercise & weight monitoring are important for maintaining a healthy lifestyle. Congestive Heart Failure  You may be retaining fluid if you have a history of heart failure or if you experience any of the following symptoms:  Weight gain of 3 pounds or more overnight or 5 pounds in a week, increased swelling in our hands or feet or shortness of breath while lying flat in bed. Please call your doctor as soon as you notice any of these symptoms; do not wait until your next office visit. Recognize signs and symptoms of STROKE:  F - face looks uneven  A - arms unable to move or move even  S - speech slurred or non-existent  T - time-call 911 as soon as signs and symptoms begin-DO NOT go         Back to bed or wait to see if you get better-TIME IS BRAIN. Warning signs of HEART ATTACK  Call 911 if you have these symptoms    · Chest discomfort. Most heart attacks involve discomfort in the center of the chest that lasts more than a few minutes, or that goes away and comes back. It can feel like uncomfortable pressure, squeezing, fullness, or pain. · Discomfort in other areas of the upper body. Symptoms can include pain or discomfort in one or both        Arms, the back, neck, jaw, or stomach. ·  Shortness of breath with or without chest discomfort.   · Other signs may include breaking out in a cold sweat, nausea, or lightheadedness    Don't wait more than five minutes to call 911 - MINUTES MATTER! Fast action can save your life. Calling 911 is almost always the fastest way to get lifesaving treatment. Emergency Medical Services staff can begin treatment when they arrive - up to an hour sooner than if someone gets to the hospital by car. [facility] MEDICATION AND SIDE EFFECT GUIDE    The [employer] MEDICATION AND SIDE EFFECT GUIDE was provided to the PATIENT AND CARE PROVIDER. Information provided includes instruction about drug purpose and common side effects for the following medications:    · [id number] [facility] MD Yesenia, FACS  General Surgery at [address] [place] [address], [address] [place] , [facility] [address] [place] [address], [facility] FABIA[place]. Jaziel Kerr.  [phone]  Fax [phone]

## 2018-03-19 NOTE — H&P (VIEW-ONLY)
Surgery History and Physical 
 
Subjective:  
  
Emelina Duron is a 74 y. o. white male who presents for evaluation of a cyst.  Mr. Isael Tejeda has had a cyst on his back for years. About 8 months ago, the cyst increased in size. About 2 weeks ago, the cyst became red and painful. He denies any drainage or fever. His blood sugars have been fine. He was seen a [facility] yesterday and started on Doxycycline. He denies any previous skin infections, but believes that he acquired MRSA when he had his bladder surgery. Past Medical History:  
Diagnosis Date  Arrhythmia   
 frequent ectopic beats  Arthritis  Cancer [facility]) 1999  
 bladder  Carotid atherosclerosis 2/22/2011  Chronic obstructive pulmonary disease ([facility] [address]  Chronic pain   
 left shoulder  CKD (chronic kidney disease) Stage 3  GERD (gastroesophageal reflux disease)  Hypercholesterolemia Hyperlipidemia.  Hypertension  Ill-defined condition   
 hyperlipidemia  NIDDM (non-insulin dependent diabetes mellitus)  PAD (peripheral artery disease) ([address]  Unspecified sleep apnea   
 non compliant with CPAP Past Surgical History:  
Procedure Laterality Date [place] CONDUIT/SIGMOID BLADDER  1999  HX CATARACT REMOVAL Bilateral   
 HX KNEE ARTHROSCOPY Left   
 repair torn meniscus  HX ORTHOPAEDIC Left   
 remove heel spur  HX OTHER SURGICAL Left 2011  
 remove benign growth on neck  HX SHOULDER REPLACEMENT Left 2012  VASCULAR SURGERY PROCEDURE UNLIST Bilateral   
 BLE stents Family History Problem Relation Age of Onset  Heart Disease Mother  Cancer Father   
  rectal  
 Cancer Sister   
  colon  Diabetes Brother Social History Substance Use Topics  Smoking status: Former Smoker Packs/day: 1.00 Years: 40.00 Quit date: 5/4/1999  Smokeless tobacco: Never Used  Alcohol use Yes Comment: vvery rare beer Prior to Admission medications Medication Sig Start Date End Date Taking? Authorizing Provider  
warfarin (COUMADIN) 2.5 mg tablet Take 2.5 mg by mouth daily. Yes Historical Provider  
aspirin delayed-release 81 mg tablet Take  by mouth daily. Yes Historical Provider BUMETANIDE (BUMEX PO) Take 1 mg by mouth daily. Yes Historical Provider  
lisinopril (PRINIVIL, ZESTRIL) 5 mg tablet Take 5 mg by mouth daily. Yes Historical Provider  
insulin NPH (HUMULIN N) 100 unit/mL injection 45 Units by SubCUTAneous route nightly. Yes Historical Provider  
insulin NPH (HUMULIN N) 100 unit/mL injection 50 Units by SubCUTAneous route Daily (before breakfast). Yes Historical Provider  
sodium bicarbonate 650 mg tablet Take 1,950 mg by mouth daily. Yes Historical Provider Cholecalciferol, Vitamin D3, (VITAMIN D3) 1,000 unit cap Take 1 Cap by mouth daily. Yes Historical Provider  
psyllium (METAMUCIL) 0.52 gram capsule Take 2 Caps by mouth daily. Yes Historical Provider Omeprazole delayed release (PRILOSEC D/R) 20 mg tablet Take 20 mg by mouth daily. Yes Historical Provider  
vit B Cmplx 3-FA-Vit C-Biotin (NEPHRO SHIVANI RX) 1- mg-mg-mcg tablet Take 1 Tab by mouth daily. Yes Historical Provider  
simvastatin (ZOCOR) 40 mg tablet Take 40 mg by mouth nightly. Yes Historical Provider INSULIN LISPRO (HUMALOG SC) 15-20 Units by SubCUTAneous route Before breakfast, lunch, and dinner. 15 units morning and noon, 20 units with dinner   Yes Historical Provider  
oxyCODONE-acetaminophen (PERCOCET) 5-325 mg per tablet Take 1-2 Tabs by mouth every four (4) hours as needed for Pain. Max Daily Amount: 12 Tabs. 5/13/15   Gaylin Poet, NP  
promethazine (PHENERGAN) 25 mg tablet Take 1 Tab by mouth every six (6) hours as needed for Nausea. 5/13/15   Gaylin Poet, NP  
diazepam (VALIUM) 5 mg tablet Take 1 Tab by mouth every six (6) hours as needed for Anxiety.  Max Daily Amount: 20 mg. 5/13/15   Gaylin Poet, NP  
gabapentin (NEURONTIN) 300 mg capsule Take 300 mg by mouth nightly. Historical Provider Fenofibrate 54 mg Tab Take 54 mg by mouth daily. Historical Provider No Known Allergies Review of Systems: A comprehensive review of systems was negative except for that written in the History of Present Illness. Objective:  
 
 Physical Exam: 
GENERAL: alert, cooperative, no distress, appears stated age, EYE: negative findings: anicteric sclera, LYMPHATIC: Cervical, supraclavicular, and axillary nodes normal. , THROAT & NECK: neck supple and symmetrical.  The thyroid is grossly normal., LUNG: clear to auscultation bilaterally, HEART: regular rate and rhythm, ABDOMEN: Soft, NT, ND., EXTREMITIES:  no edema, SKIN: Along the right upper back, there is an approximately 4 x 4 cm raised, firm, tender, well-circumscribed, superficial cystic mass. There is mild erythema, bt no fluctuance or drainage., NEUROLOGIC: negative, PSYCHIATRIC: non focal 
 
Assessment:  
 
Infected sebaceous cyst of the right upper back. Plan:  
 
Mr. Desmond Silva does not appear to have an abscess at this time requiring drainage. He would like to have the cyst removed though. He will need to be off his Coumadin for at least 5 days, and this will need to be discussed with his cardiologist.  I discussed the risks of the procedure including bleeding, persistent infection, wound healing problems, recurrence of the cyst, seroma, and reaction to the prep or local anesthetic. He understands the risks; any and all questions were answered to his satisfaction. Mr. Desmond Silva will be scheduled for an elective outpatient excision of this cyst of the right upper back under MAC with local anesthesia. He will continue his present antibiotics until that time. Signed By: Adriana Khan MD   
 March 2, 2018

## 2018-03-19 NOTE — INTERVAL H&P NOTE
H&P Update:  Miguelina Richardson was seen and examined. History and physical has been reviewed. The patient has been examined.  There have been no significant clinical changes since the completion of the originally dated History and Physical.    Signed By: Amie Lai MD     March 19, 2018 11:40 AM

## 2018-03-20 ENCOUNTER — TELEPHONE (OUTPATIENT)
Dept: SURGERY | Age: 73
End: 2018-03-20

## 2018-03-20 NOTE — TELEPHONE ENCOUNTER
Called to follow up with patient after surgery he said he is doing good. He is a little sore, just went today to fill his prescription. He is up and moving around the house. He has reviewed his discharge instructions and has no questions or concerns. Urine and bowels are flowing.  Follow up set for 4/2/18 will call office if he has any further  questions or concerns

## 2018-03-26 ENCOUNTER — TELEPHONE (OUTPATIENT)
Dept: SURGERY | Age: 73
End: 2018-03-26

## 2018-03-26 NOTE — TELEPHONE ENCOUNTER
Returned pts call using 2 pt identifiers. Pt is 7 days s/p Incision and drainage of cyst on upper back. Pt stated he is out of the packing and wanted to know if Dr. Batsheva Christianson wanted him to \"stop packing\" it. Denies pain, redness, and swelling, but has \"little bleeding\". Advised pt I would speak to Dr. Batsheva Christianson and return his call. Pt expressed understanding and agreement.

## 2018-03-26 NOTE — TELEPHONE ENCOUNTER
Spoke to pt and advised him per Dr. Ericka Campoverde to continue to pack his would using 1/4 Iodoform. Pt expressed understanding and will go to pharmacy.

## 2018-04-02 ENCOUNTER — OFFICE VISIT (OUTPATIENT)
Dept: SURGERY | Age: 73
End: 2018-04-02

## 2018-04-02 VITALS
BODY MASS INDEX: 33.43 KG/M2 | WEIGHT: 208 LBS | TEMPERATURE: 97.9 F | HEIGHT: 66 IN | RESPIRATION RATE: 14 BRPM | SYSTOLIC BLOOD PRESSURE: 143 MMHG | DIASTOLIC BLOOD PRESSURE: 69 MMHG | OXYGEN SATURATION: 98 % | HEART RATE: 78 BPM

## 2018-04-02 DIAGNOSIS — Z48.89 ENCOUNTER FOR POSTOPERATIVE WOUND CHECK: Primary | ICD-10-CM

## 2018-04-02 PROBLEM — L72.0 INFECTED EPIDERMOID CYST: Status: ACTIVE | Noted: 2018-03-02

## 2018-04-02 NOTE — PROGRESS NOTES
Subjective:      Unruly Almaguer is a 74 y. o. white male presents for postop care 2 weeks following an I&D. Mr. Corey Sandhoff is doing fine. He is still packing his wound. Objective:     Visit Vitals    /69 (BP 1 Location: Left arm, BP Patient Position: Sitting)    Pulse 78    Temp 97.9 °F (36.6 °C) (Oral)    Resp 14    Ht 5' 6\" (1.676 m)    Wt 208 lb (94.3 kg)    SpO2 98%    BMI 33.57 kg/m2       General:  alert, cooperative, no distress   Right upper back: The wound is clean with granulation. It measures approximately 5 x 1.5 cm and 1.5 cm deep. Assessment:     1st POV, s/p I&D of abscess and excision of cyst of the right upper back. Plan:     The pathology report was discussed with Mr. Corey Sandhoff. He will continue to pack his wound and f/u in 3 weeks.

## 2018-04-02 NOTE — PROGRESS NOTES
1. Have you been to the ER, urgent care clinic since your last visit? Hospitalized since your last visit?no    2. Have you seen or consulted any other health care providers outside of the [address] [place] [address] since your last visit? Include any pap smears or colon screening.  no

## 2018-04-24 ENCOUNTER — OFFICE VISIT (OUTPATIENT)
Dept: SURGERY | Age: 73
End: 2018-04-24

## 2018-04-24 VITALS
TEMPERATURE: 97.8 F | HEART RATE: 83 BPM | RESPIRATION RATE: 14 BRPM | DIASTOLIC BLOOD PRESSURE: 65 MMHG | WEIGHT: 207 LBS | SYSTOLIC BLOOD PRESSURE: 121 MMHG | OXYGEN SATURATION: 98 % | HEIGHT: 66 IN | BODY MASS INDEX: 33.27 KG/M2

## 2018-04-24 DIAGNOSIS — L72.3 SEBACEOUS CYST: ICD-10-CM

## 2018-04-24 DIAGNOSIS — Z51.89 WOUND CHECK, ABSCESS: Primary | ICD-10-CM

## 2018-04-24 NOTE — PROGRESS NOTES
1. Have you been to the ER, urgent care clinic since your last visit? Hospitalized since your last visit?no    2. Have you seen or consulted any other health care providers outside of the [address] [place] [address] since your last visit? Include any pap smears or colon screening.  no

## 2018-04-24 NOTE — PROGRESS NOTES
Subjective:      Roya Villafuerte is a 74 y. o. white male presents for postop care 5 weeks following an I&D. Mr. Christiano Marquez is doing fine. He is still packing his wound. Objective:     Visit Vitals    /65 (BP 1 Location: Left arm, BP Patient Position: Sitting)    Pulse 83    Temp 97.8 °F (36.6 °C) (Oral)    Resp 14    Ht 5' 6\" (1.676 m)    Wt 207 lb (93.9 kg)    SpO2 98%    BMI 33.41 kg/m2       General:  alert, cooperative, no distress   Right upper back: The wound is clean with granulation. It is shallow and measures approximately 3 x 1 cm. Assessment:     2nd F/U, s/p I&D abscess of the right upper back. Plan:     Mr. Андрей Schuster wound is healing well. He will continue packing it until it is level with the skin. At that point, he will cover it until the skin heals over. He can f/u with me prn.

## 2018-08-29 LAB
INR, EXTERNAL: 2.2
PT, EXTERNAL: 25.1

## 2018-11-06 DIAGNOSIS — E78.00 HYPERCHOLESTEREMIA: Primary | ICD-10-CM

## 2018-11-06 RX ORDER — SIMVASTATIN 40 MG/1
40 TABLET, FILM COATED ORAL DAILY
Qty: 90 TAB | Refills: 1 | Status: SHIPPED | OUTPATIENT
Start: 2018-11-06 | End: 2018-11-06 | Stop reason: SDUPTHER

## 2018-11-06 RX ORDER — SIMVASTATIN 40 MG/1
40 TABLET, FILM COATED ORAL DAILY
Qty: 90 TAB | Refills: 1 | Status: SHIPPED | OUTPATIENT
Start: 2018-11-06

## 2018-11-06 NOTE — TELEPHONE ENCOUNTER
Requested Prescriptions     Pending Prescriptions Disp Refills    simvastatin (ZOCOR) 40 mg tablet 90 Tab      Sig: Take 1 Tab by mouth daily.      Last seen 3/29/18 by Dr. Mervin Velázquez

## 2019-03-05 ENCOUNTER — OP HISTORICAL/CONVERTED ENCOUNTER (OUTPATIENT)
Dept: OTHER | Age: 74
End: 2019-03-05

## 2020-08-07 VITALS
BODY MASS INDEX: 30.86 KG/M2 | WEIGHT: 192 LBS | HEIGHT: 66 IN | SYSTOLIC BLOOD PRESSURE: 146 MMHG | DIASTOLIC BLOOD PRESSURE: 80 MMHG

## 2020-08-07 PROBLEM — R33.9 INCOMPLETE EMPTYING OF BLADDER: Status: ACTIVE | Noted: 2020-08-07

## 2020-08-07 PROBLEM — N39.0 URINARY TRACT INFECTIOUS DISEASE: Status: ACTIVE | Noted: 2020-08-07

## 2020-08-07 PROBLEM — Z96.0: Status: ACTIVE | Noted: 2020-08-07

## 2020-09-22 PROBLEM — C67.9 BLADDER CANCER (HCC): Status: ACTIVE | Noted: 2020-09-22

## 2020-09-23 ENCOUNTER — OFFICE VISIT (OUTPATIENT)
Dept: UROLOGY | Age: 75
End: 2020-09-23
Payer: MEDICARE

## 2020-09-23 VITALS — HEIGHT: 66 IN | WEIGHT: 182 LBS | TEMPERATURE: 99.3 F | BODY MASS INDEX: 29.25 KG/M2

## 2020-09-23 DIAGNOSIS — R31.9 URINARY TRACT INFECTION WITH HEMATURIA, SITE UNSPECIFIED: ICD-10-CM

## 2020-09-23 DIAGNOSIS — C67.9 MALIGNANT NEOPLASM OF URINARY BLADDER, UNSPECIFIED SITE (HCC): ICD-10-CM

## 2020-09-23 DIAGNOSIS — R31.29 OTHER MICROSCOPIC HEMATURIA: Primary | ICD-10-CM

## 2020-09-23 DIAGNOSIS — N39.0 URINARY TRACT INFECTION WITH HEMATURIA, SITE UNSPECIFIED: ICD-10-CM

## 2020-09-23 DIAGNOSIS — Z96.0: ICD-10-CM

## 2020-09-23 DIAGNOSIS — R33.9 INCOMPLETE EMPTYING OF BLADDER: ICD-10-CM

## 2020-09-23 DIAGNOSIS — N18.9 CHRONIC KIDNEY DISEASE, UNSPECIFIED CKD STAGE: ICD-10-CM

## 2020-09-23 LAB
BILIRUB UR QL STRIP: NEGATIVE
GLUCOSE UR-MCNC: NEGATIVE MG/DL
KETONES P FAST UR STRIP-MCNC: NEGATIVE MG/DL
PH UR STRIP: 7 [PH] (ref 4.6–8)
PROT UR QL STRIP: NORMAL
SP GR UR STRIP: 1.02 (ref 1–1.03)
UA UROBILINOGEN AMB POC: NORMAL (ref 0.2–1)
URINALYSIS CLARITY POC: CLEAR
URINALYSIS COLOR POC: YELLOW
URINE BLOOD POC: NORMAL
URINE LEUKOCYTES POC: NEGATIVE
URINE NITRITES POC: NEGATIVE

## 2020-09-23 PROCEDURE — 81003 URINALYSIS AUTO W/O SCOPE: CPT | Performed by: UROLOGY

## 2020-09-23 PROCEDURE — 99214 OFFICE O/P EST MOD 30 MIN: CPT | Performed by: UROLOGY

## 2020-09-23 PROCEDURE — G8427 DOCREV CUR MEDS BY ELIG CLIN: HCPCS | Performed by: UROLOGY

## 2020-09-23 RX ORDER — LANOLIN ALCOHOL/MO/W.PET/CERES
325 CREAM (GRAM) TOPICAL
COMMUNITY
End: 2020-12-02 | Stop reason: ALTCHOICE

## 2020-09-23 RX ORDER — UREA 10 %
1000 LOTION (ML) TOPICAL DAILY
COMMUNITY

## 2020-09-23 NOTE — PROGRESS NOTES
HISTORY OF PRESENT ILLNESS  Padilla Richardson is a 66 y.o. male. Chief Complaint   Patient presents with    Follow-up    Recurrent UTI     He has a h/o cystectomy and neobladder. He voids and does not catheterize. He is continent during the day. He wears a pad at nighttime. He does not wake to void. He states he has been treated for UTIs. His last urine did not have infection but he had blood. He did not see gross blood. Problem List  Date Reviewed: 9/23/2020          Codes Class Noted    Bladder cancer [facility]) ICD-10-CM: C67.9  ICD-9-CM: 188.9  9/22/2020    Overview Signed 9/22/2020  4:19 PM by Maulik Nolan NP     He had a cystoprostatectomy 1998 with Dr. Miguel Martinez. He has a neobladder. History of artificial bladder ICD-10-CM: Z96.0  ICD-9-CM: V43.5  8/7/2020    Overview Signed 9/22/2020  4:18 PM by Maulik Nolan NP     He has a neobladder via Dr. Miguel Martinez in [address] [place] [address]. Incomplete emptying of bladder ICD-10-CM: R33.9  ICD-9-CM: 788.21  8/7/2020    Overview Signed 9/22/2020  4:14 PM by Maulik Nolan NP     He has a neobladder. He is not routinely self catheterizing. He voids with strain, but can sense when his bladder is full. He empties well with valsalva voiding. Urinary tract infectious disease ICD-10-CM: N39.0  ICD-9-CM: 599.0  8/7/2020    Overview Signed 9/22/2020  4:11 PM by Maulik Nolan NP     He has recurrent UTIs. He was in [facility] 10/11/19 with Klebsiella bacteremia and UTI R to ampicillin/ augmentin. Similar to prior UTIs. Cutaneous abscess of back excluding buttocks ICD-10-CM: L02.212  ICD-9-CM: 682.2  3/13/2018    Overview Addendum 4/2/2018  9:25 AM by Michelle Cain MD     S/P I&D; right upper back. Epidermal inclusion cyst ICD-10-CM: L72.0  ICD-9-CM: 706.2  3/2/2018    Overview Addendum 4/2/2018  9:25 AM by Michelle Puente., MD     S/P excision; right upper back.              Cervical spinal stenosis ICD-10-CM: M48.02  ICD-9-CM: 723.0  5/12/2015        Cervical stenosis of spine ICD-10-CM: M48.02  ICD-9-CM: 723.0  5/12/2015        Carotid atherosclerosis ICD-10-CM: I65.29  ICD-9-CM: 433.10  2/22/2011    Overview Signed 2/22/2011 10:10 AM by Ida Smith     Mild left internal carotid artery stenosis 10%-49% by duplex scan 11/09 and 2/11. PAD (peripheral artery disease) ([facility] [address] ICD-10-CM: I73.9  ICD-9-CM: 443.9  Unknown    Overview Signed 2/22/2011 10:11 AM by Elsi Mays III     Peripheral vascular disease status post bilateral iliac stenting March 2009. CKD (chronic kidney disease) ICD-10-CM: N18.9  ICD-9-CM: 585. 9  Unknown    Overview Signed 9/22/2020  4:19 PM by Any Avery NP     Followed by Nephrology [facility] [place] [facility]). CKD, stage 3 due to presumed hypertensive nephrosclerosis. NIDDM (non-insulin dependent diabetes mellitus) ICD-9-CM: 250.00  Unknown        Other primary cardiomyopathies ICD-10-CM: I42.8  ICD-9-CM: 425.4  10/18/2010    Overview Addendum 9/26/2011 10:30 AM by Ida Smith     History of mild cardiomyopathy diagnosed in 2005 with most recent adenosine cardiolite November 2009 without evidence of myocardial ischemia and EF of 62% and with echocardiogram September 2011 with EF of 50%-55%. Mixed hyperlipidemia ICD-10-CM: E78.2  ICD-9-CM: 272.2  10/18/2010        Essential hypertension, benign ICD-10-CM: I10  ICD-9-CM: 401.1  10/18/2010               Review of Systems   All other systems reviewed and are negative. Past Medical History:   Diagnosis Date    Arrhythmia     Atrial fibrillation.     Arthritis     Cancer [facility]) 1999    bladder    Carotid atherosclerosis 2/22/2011    Chronic obstructive pulmonary disease (HCC)     Chronic pain     left shoulder--resolved after shoulder replacement    CKD (chronic kidney disease)     Stage 3    GERD (gastroesophageal reflux disease)  History of artificial bladder 8/7/2020    Hypercholesterolemia     Hyperlipidemia.  Hypertension     Ill-defined condition     hyperlipidemia    Incomplete emptying of bladder 8/7/2020    NIDDM (non-insulin dependent diabetes mellitus)     PAD [facility]peripheral artery disease) ([facility])     Unspecified sleep apnea     non compliant with CPAP    Urinary tract infectious disease 8/7/2020      Past Surgical History:   Procedure Laterality Date    CARDIAC SURG PROCEDURE UNLIST      heart procedure     CYSTECTOMY,ILEAL CONDUIT/SIGMOID BLADDER  1999    HX CATARACT REMOVAL Bilateral     HX CERVICAL FUSION  05/2015    C5-6    HX KNEE ARTHROSCOPY Left     repair torn meniscus    HX KNEE REPLACEMENT Left 02/2017    HX ORTHOPAEDIC Left     remove heel spur    HX OTHER SURGICAL Left 2011    remove benign growth on neck    HX OTHER SURGICAL      Excision of cyst with incision and drainage of abscess of the right upper back. [address]    removed with bladder    HX SHOULDER REPLACEMENT Left 2012    VASCULAR SURGERY PROCEDURE UNLIST Bilateral     BLE stents     Family History   Problem Relation Age of Onset    Heart Disease Mother     Cancer Father         rectal    Cancer Sister         colon    Diabetes Brother         Physical Exam  Constitutional:       General: He is not in acute distress. Appearance: Normal appearance. HENT:      Head: Normocephalic and atraumatic. Eyes:      Extraocular Movements: Extraocular movements intact. Cardiovascular:      Rate and Rhythm: Normal rate and regular rhythm. Pulmonary:      Effort: Pulmonary effort is normal. No respiratory distress. Breath sounds: Normal breath sounds. No wheezing or rhonchi. Musculoskeletal: Normal range of motion. Lymphadenopathy:      Cervical: No cervical adenopathy. Skin:     General: Skin is warm and dry. Neurological:      General: No focal deficit present.       Mental Status: He is alert and oriented to person, place, and time. Psychiatric:         Mood and Affect: Mood normal.         Behavior: Behavior normal.           No results found for this or any previous visit. ASSESSMENT and PLAN  Diagnoses and all orders for this visit:    1. Other microscopic hematuria  Comments:  Microhematuria. Check CT abd/pel w wo contrast, cystoscopy    2. Malignant neoplasm of urinary bladder, unspecified site [facility])  Assessment & Plan:  History of cystoprostatectomy 1998 with Dr. Gibson Sandhoff.      3. History of artificial bladder    4. Urinary tract infection with hematuria, site unspecified  Assessment & Plan:  He has a history of persistent UTIs. He last had Klebsiella. He has a neobladder. 5. Incomplete emptying of bladder    6. Chronic kidney disease, unspecified CKD stage  Assessment & Plan:  He is following with nephrology.   History of neobladder      Other orders  -     AMB POC URINALYSIS DIP STICK AUTO W/O MICRO  -     URINALYSIS W/MICROSCOPIC         Follow-up and Dispositions    · Return for cystoscopy/ [facility], CT abd/pel wo contrast.         Deb Maravilla MD

## 2020-09-24 LAB
APPEARANCE UR: ABNORMAL
BACTERIA #/AREA URNS HPF: ABNORMAL /[HPF]
BILIRUB UR QL STRIP: NEGATIVE
CASTS URNS QL MICRO: ABNORMAL /LPF
COLOR UR: YELLOW
EPI CELLS #/AREA URNS HPF: ABNORMAL /HPF (ref 0–10)
GLUCOSE UR QL: NEGATIVE
HGB UR QL STRIP: NEGATIVE
KETONES UR QL STRIP: NEGATIVE
LEUKOCYTE ESTERASE UR QL STRIP: NEGATIVE
MICRO URNS: ABNORMAL
MUCOUS THREADS URNS QL MICRO: PRESENT
NITRITE UR QL STRIP: NEGATIVE
PH UR STRIP: 6.5 [PH] (ref 5–7.5)
PROT UR QL STRIP: ABNORMAL
RBC #/AREA URNS HPF: ABNORMAL /HPF (ref 0–2)
SP GR UR: 1.01 (ref 1–1.03)
UROBILINOGEN UR STRIP-MCNC: 0.2 MG/DL (ref 0.2–1)
WBC #/AREA URNS HPF: ABNORMAL /HPF (ref 0–5)

## 2020-09-29 ENCOUNTER — TRANSCRIBE ORDER (OUTPATIENT)
Dept: SCHEDULING | Age: 75
End: 2020-09-29

## 2020-09-29 DIAGNOSIS — R31.29 MICROHEMATURIA: Primary | ICD-10-CM

## 2020-11-05 ENCOUNTER — TELEPHONE (OUTPATIENT)
Dept: UROLOGY | Age: 75
End: 2020-11-05

## 2020-11-05 NOTE — TELEPHONE ENCOUNTER
Spoke with pt in regards to his appointment next week and reminded him to have CT completed.  He is scheduled tomorrow 11[facility]

## 2020-11-06 ENCOUNTER — HOSPITAL ENCOUNTER (OUTPATIENT)
Dept: CT IMAGING | Age: 75
Discharge: HOME | End: 2020-11-06
Attending: UROLOGY
Payer: MEDICARE

## 2020-11-06 DIAGNOSIS — R31.29 MICROHEMATURIA: ICD-10-CM

## 2020-11-06 PROCEDURE — 74176 CT ABD & PELVIS W/O CONTRAST: CPT

## 2020-11-11 PROBLEM — R31.29 MICROHEMATURIA: Status: ACTIVE | Noted: 2020-11-11

## 2020-11-12 ENCOUNTER — OFFICE VISIT (OUTPATIENT)
Dept: UROLOGY | Age: 75
End: 2020-11-12
Payer: MEDICARE

## 2020-11-12 VITALS — TEMPERATURE: 98.2 F

## 2020-11-12 DIAGNOSIS — N21.0 BLADDER STONE: ICD-10-CM

## 2020-11-12 DIAGNOSIS — C67.9 MALIGNANT NEOPLASM OF URINARY BLADDER, UNSPECIFIED SITE (HCC): Primary | ICD-10-CM

## 2020-11-12 DIAGNOSIS — N39.0 URINARY TRACT INFECTION WITH HEMATURIA, SITE UNSPECIFIED: ICD-10-CM

## 2020-11-12 DIAGNOSIS — R33.9 INCOMPLETE EMPTYING OF BLADDER: ICD-10-CM

## 2020-11-12 DIAGNOSIS — R31.9 URINARY TRACT INFECTION WITH HEMATURIA, SITE UNSPECIFIED: ICD-10-CM

## 2020-11-12 DIAGNOSIS — R31.29 MICROHEMATURIA: ICD-10-CM

## 2020-11-12 DIAGNOSIS — Z96.0: ICD-10-CM

## 2020-11-12 LAB
BILIRUB UR QL STRIP: NEGATIVE
GLUCOSE UR-MCNC: NEGATIVE MG/DL
KETONES P FAST UR STRIP-MCNC: NEGATIVE MG/DL
PH UR STRIP: 6.5 [PH] (ref 4.6–8)
PROT UR QL STRIP: NEGATIVE
SP GR UR STRIP: 1.02 (ref 1–1.03)
UA UROBILINOGEN AMB POC: NORMAL (ref 0.2–1)
URINALYSIS CLARITY POC: CLEAR
URINALYSIS COLOR POC: YELLOW
URINE BLOOD POC: NORMAL
URINE LEUKOCYTES POC: NEGATIVE
URINE NITRITES POC: NEGATIVE

## 2020-11-12 PROCEDURE — 81003 URINALYSIS AUTO W/O SCOPE: CPT | Performed by: UROLOGY

## 2020-11-12 PROCEDURE — 52000 CYSTOURETHROSCOPY: CPT | Performed by: UROLOGY

## 2020-11-12 NOTE — PROGRESS NOTES
HISTORY OF PRESENT ILLNESS  Zuri Richardson is a 66 y.o. male. Chief Complaint   Patient presents with    Cystoscopy    Hematuria     He is here for lower urinary tract evaluation via cystoscopy related to his microhematuria. Patient reports that his recent UA's did not show signs of infection but did have blood. He did not see gross blood. UA micro with 3-10 RBC/hpf on 9/23/2020. CT 11/6/2020: Patient status post cystectomy and creation of a neobladder. No evidence of hydronephrosis. No stones are identified. Chronic Conditions Addressed Today     1. Incomplete emptying of bladder     Overview      He has a neobladder. He is not routinely self catheterizing. He voids with strain, but can sense when his bladder is full. He empties well with valsalva voiding. Relevant Orders     AMB POC URINALYSIS DIP STICK AUTO W/O MICRO (Completed)    2. Urinary tract infectious disease     Overview      He has recurrent UTI with neobladder. Relevant Orders     AMB POC URINALYSIS DIP STICK AUTO W/O MICRO (Completed)    3. Bladder cancer [facility]) - Primary     Overview      He had a cystoprostatectomy 1998 with Dr. Nash Clifton. He has a neobladder. Relevant Orders     AMB POC URINALYSIS DIP STICK AUTO W/O MICRO (Completed)    4. Microhematuria     Overview      Patient reports that his recent UA's did not show signs of infection did have blood. He did not see gross blood. UA micro with 3-10 RBC/hpf on 9/23/2020. CT 11/6/2020: Patient status post cystectomy and creation of a neobladder. No  evidence of hydronephrosis. No stones are identified. Review of Systems   All other systems reviewed and are negative. Past Medical History:   Diagnosis Date    Arrhythmia     Atrial fibrillation.     Arthritis     Cancer [facility]) 1999    bladder    Carotid atherosclerosis 2/22/2011    Chronic obstructive pulmonary disease (HCC)     Chronic pain     left shoulder--resolved after shoulder replacement    CKD (chronic kidney disease)     Stage 3    GERD (gastroesophageal reflux disease)     History of artificial bladder 8/7/2020    Hypercholesterolemia     Hyperlipidemia.  Hypertension     Ill-defined condition     hyperlipidemia    Incomplete emptying of bladder 8/7/2020    NIDDM (non-insulin dependent diabetes mellitus)     [facility]AD [facility]peripheral artery disease) ([facility])     Unspecified sleep apnea     non compliant with CPAP    Urinary tract infectious disease 8/7/2020      Past Surgical History:   Procedure Laterality Date    CARDIAC SURG PROCEDURE UNLIST      heart procedure     CYSTECTOMY,ILEAL CONDUIT/SIGMOID BLADDER  1999    HX CATARACT REMOVAL Bilateral     HX CERVICAL FUSION  05/2015    C5-6    HX KNEE ARTHROSCOPY Left     repair torn meniscus    HX KNEE REPLACEMENT Left 02/2017    HX ORTHOPAEDIC Left     remove heel spur    HX OTHER SURGICAL Left 2011    remove benign growth on neck    HX OTHER SURGICAL      Excision of cyst with incision and drainage of abscess of the right upper back. [address]    removed with bladder    HX SHOULDER REPLACEMENT Left 2012    HX UROLOGICAL      cystoscopy     VASCULAR SURGERY PROCEDURE UNLIST Bilateral     BLE stents     Family History   Problem Relation Age of Onset    Heart Disease Mother     Cancer Father         rectal    Cancer Sister         colon    Diabetes Brother         Physical Exam  Constitutional:       General: He is not in acute distress. Appearance: Normal appearance. HENT:      Head: Normocephalic and atraumatic. Eyes:      Extraocular Movements: Extraocular movements intact. Cardiovascular:      Rate and Rhythm: Normal rate and regular rhythm. Pulmonary:      Effort: Pulmonary effort is normal. No respiratory distress. Breath sounds: Normal breath sounds. No wheezing or rhonchi. Genitourinary:     Penis: Normal. No phimosis, hypospadias or tenderness. Scrotum/Testes: Normal.         Right: Mass, tenderness or swelling not present. Left: Mass, tenderness or swelling not present. Epididymis:      Right: Normal. No mass or tenderness. Left: Normal. No mass or tenderness. Musculoskeletal: Normal range of motion. Lymphadenopathy:      Cervical: No cervical adenopathy. Skin:     General: Skin is warm and dry. Neurological:      General: No focal deficit present. Mental Status: He is alert and oriented to person, place, and time. Psychiatric:         Mood and Affect: Mood normal.         Behavior: Behavior normal.       Cystoscopy - office    Patient presented for cystoscopy. He was placed supine on the procedure table and his genitals were prepped and with Betadine. Using 16 French flexible cystoscope cystourethroscopy was performed. The urethra was patent without stricturing. There is no prostatic urethra. The neobladder mucosa normal in appearance. There was an adherent calculus to the wall near the afferent portion. It was jagged/ crystalline, about 5mm. The ureteral orifices were not visualized. Summary: bladder stone in neobladder              ASSESSMENT and PLAN  Diagnoses and all orders for this visit:    1. Malignant neoplasm of urinary bladder, unspecified site [facility])  Assessment & Plan:  No evidence of recurrence on CT 11/6/2020 or cystoscopy of neobladder 11/12/2020. Key Pain Meds             oxyCODONE-acetaminophen (PERCOCET) 5-325 mg per tablet Take 1 Tab by mouth every four (4) hours as needed for Pain. Max Daily Amount: 6 Tabs. Orders:  -     AMB POC URINALYSIS DIP STICK AUTO W/O MICRO    2. Microhematuria  Assessment & Plan:  Cystoscopy today reveals an about 5mm stone adherent to the neobladder, proximally. 3. Urinary tract infection with hematuria, site unspecified  -     AMB POC URINALYSIS DIP STICK AUTO W/O MICRO    4.  Incomplete emptying of bladder  -     AMB POC URINALYSIS DIP STICK AUTO W/O MICRO    5. Bladder stone  Assessment & Plan:  ~5mm stone in neobladder,adherent. We discussed laser litholapaxy. The patient was counseled on the risks, benefits and expected course of surgery. Surgery has risks of bleeding, infection, injury, pain, death or other consequences. Some specific risks of surgery were discussed as well. He wishes to proceed.        6. History of artificial bladder  Assessment & Plan:  Stone in the neobladder on cystoscopy               Mary Grace Brasher NP

## 2020-11-12 NOTE — ASSESSMENT & PLAN NOTE
~5mm stone in neobladder,adherent. We discussed laser litholapaxy. The patient was counseled on the risks, benefits and expected course of surgery. Surgery has risks of bleeding, infection, injury, pain, death or other consequences. Some specific risks of surgery were discussed as well. He wishes to proceed.

## 2020-11-12 NOTE — ASSESSMENT & PLAN NOTE
No evidence of recurrence on CT 11/6/2020 or cystoscopy of neobladder 11/12/2020. Key Pain Meds             oxyCODONE-acetaminophen (PERCOCET) 5-325 mg per tablet Take 1 Tab by mouth every four (4) hours as needed for Pain. Max Daily Amount: 6 Tabs.

## 2020-11-23 ENCOUNTER — HOSPITAL ENCOUNTER (OUTPATIENT)
Dept: PREADMISSION TESTING | Age: 75
Discharge: HOME | End: 2020-11-23
Payer: MEDICARE

## 2020-11-23 VITALS
SYSTOLIC BLOOD PRESSURE: 138 MMHG | OXYGEN SATURATION: 99 % | RESPIRATION RATE: 16 BRPM | HEART RATE: 66 BPM | BODY MASS INDEX: 31.11 KG/M2 | DIASTOLIC BLOOD PRESSURE: 75 MMHG | WEIGHT: 186.73 LBS | TEMPERATURE: 97.7 F | HEIGHT: 65 IN

## 2020-11-23 LAB
ANION GAP SERPL CALC-SCNC: 6 MMOL/L (ref 5–15)
APPEARANCE UR: CLEAR
ATRIAL RATE: 63 BPM
BACTERIA URNS QL MICRO: NEGATIVE /HPF
BILIRUB UR QL: NEGATIVE
BUN SERPL-MCNC: 18 MG/DL (ref 6–20)
BUN/CREAT SERPL: 12 (ref 12–20)
CA-I BLD-MCNC: 8.2 MG/DL (ref 8.5–10.1)
CALCULATED P AXIS, ECG09: 31 DEGREES
CALCULATED R AXIS, ECG10: 67 DEGREES
CALCULATED T AXIS, ECG11: 76 DEGREES
CHLORIDE SERPL-SCNC: 109 MMOL/L (ref 97–108)
CO2 SERPL-SCNC: 28 MMOL/L (ref 21–32)
COLOR UR: ABNORMAL
CREAT SERPL-MCNC: 1.47 MG/DL (ref 0.7–1.3)
DIAGNOSIS, 93000: NORMAL
ERYTHROCYTE [DISTWIDTH] IN BLOOD BY AUTOMATED COUNT: 15.5 % (ref 11.5–14.5)
GLUCOSE SERPL-MCNC: 154 MG/DL (ref 65–100)
GLUCOSE UR STRIP.AUTO-MCNC: NEGATIVE MG/DL
HCT VFR BLD AUTO: 41 % (ref 36.6–50.3)
HGB BLD-MCNC: 13.3 G/DL (ref 12.1–17)
HGB UR QL STRIP: ABNORMAL
KETONES UR QL STRIP.AUTO: NEGATIVE MG/DL
LEUKOCYTE ESTERASE UR QL STRIP.AUTO: NEGATIVE
MCH RBC QN AUTO: 29.9 PG (ref 26–34)
MCHC RBC AUTO-ENTMCNC: 32.4 G/DL (ref 30–36.5)
MCV RBC AUTO: 92.1 FL (ref 80–99)
MUCOUS THREADS URNS QL MICRO: ABNORMAL /LPF
NITRITE UR QL STRIP.AUTO: NEGATIVE
OTHER URINE MICRO,5051: 1
P-R INTERVAL, ECG05: 174 MS
PH UR STRIP: 7 [PH] (ref 5–8)
PLATELET # BLD AUTO: 207 K/UL (ref 150–400)
PMV BLD AUTO: 9.5 FL (ref 8.9–12.9)
POTASSIUM SERPL-SCNC: 3.6 MMOL/L (ref 3.5–5.1)
PROT UR STRIP-MCNC: NEGATIVE MG/DL
Q-T INTERVAL, ECG07: 432 MS
QRS DURATION, ECG06: 84 MS
QTC CALCULATION (BEZET), ECG08: 442 MS
RBC # BLD AUTO: 4.45 M/UL (ref 4.1–5.7)
RBC #/AREA URNS HPF: ABNORMAL /HPF (ref 0–5)
SODIUM SERPL-SCNC: 143 MMOL/L (ref 136–145)
SP GR UR REFRACTOMETRY: 1.01 (ref 1–1.03)
UROBILINOGEN UR QL STRIP.AUTO: 0.1 EU/DL (ref 0.1–1)
VENTRICULAR RATE, ECG03: 63 BPM
WBC # BLD AUTO: 5.8 K/UL (ref 4.1–11.1)
WBC URNS QL MICRO: ABNORMAL /HPF (ref 0–4)

## 2020-11-23 PROCEDURE — 81001 URINALYSIS AUTO W/SCOPE: CPT

## 2020-11-23 PROCEDURE — 36415 COLL VENOUS BLD VENIPUNCTURE: CPT

## 2020-11-23 PROCEDURE — 93005 ELECTROCARDIOGRAM TRACING: CPT

## 2020-11-23 PROCEDURE — 85027 COMPLETE CBC AUTOMATED: CPT

## 2020-11-23 PROCEDURE — 87086 URINE CULTURE/COLONY COUNT: CPT

## 2020-11-23 PROCEDURE — 80048 BASIC METABOLIC PNL TOTAL CA: CPT

## 2020-11-23 NOTE — PERIOP NOTES
[id number] office called, with permission given by patient during PAT visit, requested most recent office note, ekg and any cardiac test results to be faxed to PAT dept as soon as possible. [address]  [facility] called, made aware of pt's history, ekg done today and office note from Dr. Fernanda Fernandes done Aug 2020. No new orders given, stated ok to proceed with surgery as planned. [address] [facility] [address] Oleg Walker office called, left a message for RAYMON Blum re: note with aspirin recommendations by cardiologist, pt stated he was instructed by Dr. Hoskins Prom to hold aspirin for 7 days prior to surgery.

## 2020-11-24 NOTE — PERIOP NOTES
1 Dr. Kenneth Brooks office called, left message for nurse re: patient instructed by surgeon to hold aspirin for 7 days prior to procedure, surgery scheduled on 11/30/2020 , requested note from Dr. Lu Citizen with any recommendations re: aspirin.

## 2020-11-25 ENCOUNTER — HOSPITAL ENCOUNTER (OUTPATIENT)
Dept: PREADMISSION TESTING | Age: 75
Discharge: HOME | End: 2020-11-25
Payer: MEDICARE

## 2020-11-25 LAB
BACTERIA SPEC CULT: NORMAL
SARS-COV-2, COV2: NORMAL
SPECIAL REQUESTS,SREQ: NORMAL

## 2020-11-25 PROCEDURE — 87635 SARS-COV-2 COVID-19 AMP PRB: CPT

## 2020-11-25 NOTE — PERIOP NOTES
[facility] office called, spoke with Sarath Wilson, practice manager, made aware of bmp- creatinine 1.47, to make Dr. May Rodriguez aware to review lab results.

## 2020-11-27 LAB — SARS-COV-2, COV2NT: NOT DETECTED

## 2020-11-27 RX ORDER — CEFAZOLIN SODIUM IN 0.9 % NACL 2 G/100 ML
2 PLASTIC BAG, INJECTION (ML) INTRAVENOUS ONCE
Status: CANCELLED | OUTPATIENT
Start: 2020-11-30 | End: 2020-11-30

## 2020-11-30 ENCOUNTER — HOSPITAL ENCOUNTER (OUTPATIENT)
Age: 75
Discharge: HOME | End: 2020-11-30
Attending: UROLOGY | Admitting: UROLOGY
Payer: MEDICARE

## 2020-11-30 ENCOUNTER — ANESTHESIA EVENT (OUTPATIENT)
Dept: SURGERY | Age: 75
End: 2020-11-30
Payer: MEDICARE

## 2020-11-30 ENCOUNTER — ANESTHESIA (OUTPATIENT)
Dept: SURGERY | Age: 75
End: 2020-11-30
Payer: MEDICARE

## 2020-11-30 VITALS
TEMPERATURE: 98.6 F | HEART RATE: 55 BPM | OXYGEN SATURATION: 95 % | RESPIRATION RATE: 16 BRPM | DIASTOLIC BLOOD PRESSURE: 58 MMHG | SYSTOLIC BLOOD PRESSURE: 110 MMHG

## 2020-11-30 PROBLEM — N21.0 CALCULUS IN DIVERTICULUM OF BLADDER: Status: ACTIVE | Noted: 2020-11-30

## 2020-11-30 LAB
GLUCOSE BLD STRIP.AUTO-MCNC: 121 MG/DL (ref 65–100)
PERFORMED BY, TECHID: ABNORMAL
POTASSIUM SERPL-SCNC: 3.4 MMOL/L (ref 3.5–5.1)

## 2020-11-30 PROCEDURE — 82962 GLUCOSE BLOOD TEST: CPT

## 2020-11-30 PROCEDURE — 74011000258 HC RX REV CODE- 258: Performed by: NURSE ANESTHETIST, CERTIFIED REGISTERED

## 2020-11-30 PROCEDURE — 84132 ASSAY OF SERUM POTASSIUM: CPT

## 2020-11-30 PROCEDURE — 36415 COLL VENOUS BLD VENIPUNCTURE: CPT

## 2020-11-30 PROCEDURE — 74011250636 HC RX REV CODE- 250/636: Performed by: NURSE ANESTHETIST, CERTIFIED REGISTERED

## 2020-11-30 PROCEDURE — 74011250636 HC RX REV CODE- 250/636: Performed by: UROLOGY

## 2020-11-30 PROCEDURE — 52317 REMOVE BLADDER STONE: CPT | Performed by: UROLOGY

## 2020-11-30 PROCEDURE — 76210000026 HC REC RM PH II 1 TO 1.5 HR: Performed by: UROLOGY

## 2020-11-30 PROCEDURE — 2709999900 HC NON-CHARGEABLE SUPPLY: Performed by: UROLOGY

## 2020-11-30 PROCEDURE — 74011000250 HC RX REV CODE- 250: Performed by: NURSE ANESTHETIST, CERTIFIED REGISTERED

## 2020-11-30 PROCEDURE — 76060000032 HC ANESTHESIA 0.5 TO 1 HR: Performed by: UROLOGY

## 2020-11-30 PROCEDURE — 76210000006 HC OR PH I REC 0.5 TO 1 HR: Performed by: UROLOGY

## 2020-11-30 PROCEDURE — 76010000160 HC OR TIME 0.5 TO 1 HR INTENSV-TIER 1: Performed by: UROLOGY

## 2020-11-30 PROCEDURE — 77030019948 HC FBR LSR HOLM DISP OCOA -E: Performed by: UROLOGY

## 2020-11-30 RX ORDER — SODIUM CHLORIDE 0.9 % (FLUSH) 0.9 %
5-40 SYRINGE (ML) INJECTION EVERY 8 HOURS
Status: DISCONTINUED | OUTPATIENT
Start: 2020-11-30 | End: 2020-11-30 | Stop reason: HOSPADM

## 2020-11-30 RX ORDER — SODIUM CHLORIDE 9 MG/ML
20 INJECTION, SOLUTION INTRAVENOUS ONCE
Status: COMPLETED | OUTPATIENT
Start: 2020-11-30 | End: 2020-11-30

## 2020-11-30 RX ORDER — LIDOCAINE HYDROCHLORIDE 10 MG/ML
0.1 INJECTION, SOLUTION EPIDURAL; INFILTRATION; INTRACAUDAL; PERINEURAL AS NEEDED
Status: DISCONTINUED | OUTPATIENT
Start: 2020-11-30 | End: 2020-11-30 | Stop reason: HOSPADM

## 2020-11-30 RX ORDER — SODIUM CHLORIDE 0.9 % (FLUSH) 0.9 %
5-40 SYRINGE (ML) INJECTION AS NEEDED
Status: DISCONTINUED | OUTPATIENT
Start: 2020-11-30 | End: 2020-11-30 | Stop reason: HOSPADM

## 2020-11-30 RX ORDER — ONDANSETRON 2 MG/ML
4 INJECTION INTRAMUSCULAR; INTRAVENOUS AS NEEDED
Status: DISCONTINUED | OUTPATIENT
Start: 2020-11-30 | End: 2020-11-30 | Stop reason: HOSPADM

## 2020-11-30 RX ORDER — DIPHENHYDRAMINE HYDROCHLORIDE 50 MG/ML
12.5 INJECTION, SOLUTION INTRAMUSCULAR; INTRAVENOUS AS NEEDED
Status: DISCONTINUED | OUTPATIENT
Start: 2020-11-30 | End: 2020-11-30 | Stop reason: HOSPADM

## 2020-11-30 RX ORDER — MORPHINE SULFATE 10 MG/ML
2 INJECTION, SOLUTION INTRAMUSCULAR; INTRAVENOUS
Status: DISCONTINUED | OUTPATIENT
Start: 2020-11-30 | End: 2020-11-30 | Stop reason: HOSPADM

## 2020-11-30 RX ORDER — ONDANSETRON 2 MG/ML
INJECTION INTRAMUSCULAR; INTRAVENOUS AS NEEDED
Status: DISCONTINUED | OUTPATIENT
Start: 2020-11-30 | End: 2020-11-30 | Stop reason: HOSPADM

## 2020-11-30 RX ORDER — LIDOCAINE HYDROCHLORIDE 20 MG/ML
INJECTION, SOLUTION EPIDURAL; INFILTRATION; INTRACAUDAL; PERINEURAL AS NEEDED
Status: DISCONTINUED | OUTPATIENT
Start: 2020-11-30 | End: 2020-11-30 | Stop reason: HOSPADM

## 2020-11-30 RX ORDER — MIDAZOLAM HYDROCHLORIDE 1 MG/ML
1 INJECTION, SOLUTION INTRAMUSCULAR; INTRAVENOUS AS NEEDED
Status: DISCONTINUED | OUTPATIENT
Start: 2020-11-30 | End: 2020-11-30 | Stop reason: HOSPADM

## 2020-11-30 RX ORDER — DEXAMETHASONE SODIUM PHOSPHATE 4 MG/ML
INJECTION, SOLUTION INTRA-ARTICULAR; INTRALESIONAL; INTRAMUSCULAR; INTRAVENOUS; SOFT TISSUE AS NEEDED
Status: DISCONTINUED | OUTPATIENT
Start: 2020-11-30 | End: 2020-11-30 | Stop reason: HOSPADM

## 2020-11-30 RX ORDER — CEFAZOLIN SODIUM 1 G/3ML
INJECTION, POWDER, FOR SOLUTION INTRAMUSCULAR; INTRAVENOUS AS NEEDED
Status: DISCONTINUED | OUTPATIENT
Start: 2020-11-30 | End: 2020-11-30 | Stop reason: HOSPADM

## 2020-11-30 RX ORDER — NORETHINDRONE AND ETHINYL ESTRADIOL 0.5-0.035
5 KIT ORAL AS NEEDED
Status: DISCONTINUED | OUTPATIENT
Start: 2020-11-30 | End: 2020-11-30 | Stop reason: HOSPADM

## 2020-11-30 RX ORDER — FENTANYL CITRATE 50 UG/ML
50 INJECTION, SOLUTION INTRAMUSCULAR; INTRAVENOUS AS NEEDED
Status: DISCONTINUED | OUTPATIENT
Start: 2020-11-30 | End: 2020-11-30 | Stop reason: HOSPADM

## 2020-11-30 RX ORDER — ETOMIDATE 2 MG/ML
INJECTION INTRAVENOUS AS NEEDED
Status: DISCONTINUED | OUTPATIENT
Start: 2020-11-30 | End: 2020-11-30 | Stop reason: HOSPADM

## 2020-11-30 RX ORDER — CEFAZOLIN SODIUM 2 G/100ML
2 INJECTION, SOLUTION INTRAVENOUS ONCE
Status: DISCONTINUED | OUTPATIENT
Start: 2020-11-30 | End: 2020-11-30 | Stop reason: HOSPADM

## 2020-11-30 RX ORDER — FAMOTIDINE 10 MG/ML
INJECTION INTRAVENOUS AS NEEDED
Status: DISCONTINUED | OUTPATIENT
Start: 2020-11-30 | End: 2020-11-30 | Stop reason: HOSPADM

## 2020-11-30 RX ORDER — FENTANYL CITRATE 50 UG/ML
50 INJECTION, SOLUTION INTRAMUSCULAR; INTRAVENOUS
Status: DISCONTINUED | OUTPATIENT
Start: 2020-11-30 | End: 2020-11-30 | Stop reason: HOSPADM

## 2020-11-30 RX ORDER — SODIUM CHLORIDE 9 MG/ML
INJECTION, SOLUTION INTRAVENOUS
Status: DISCONTINUED | OUTPATIENT
Start: 2020-11-30 | End: 2020-11-30 | Stop reason: HOSPADM

## 2020-11-30 RX ORDER — FENTANYL CITRATE 50 UG/ML
INJECTION, SOLUTION INTRAMUSCULAR; INTRAVENOUS AS NEEDED
Status: DISCONTINUED | OUTPATIENT
Start: 2020-11-30 | End: 2020-11-30 | Stop reason: HOSPADM

## 2020-11-30 RX ORDER — SODIUM CHLORIDE, SODIUM LACTATE, POTASSIUM CHLORIDE, CALCIUM CHLORIDE 600; 310; 30; 20 MG/100ML; MG/100ML; MG/100ML; MG/100ML
20 INJECTION, SOLUTION INTRAVENOUS CONTINUOUS
Status: DISCONTINUED | OUTPATIENT
Start: 2020-11-30 | End: 2020-11-30 | Stop reason: HOSPADM

## 2020-11-30 RX ADMIN — FENTANYL CITRATE 50 MCG: 50 INJECTION, SOLUTION INTRAMUSCULAR; INTRAVENOUS at 11:29

## 2020-11-30 RX ADMIN — ONDANSETRON 4 MG: 2 INJECTION INTRAMUSCULAR; INTRAVENOUS at 11:59

## 2020-11-30 RX ADMIN — FAMOTIDINE 10 MG: 10 INJECTION INTRAVENOUS at 11:32

## 2020-11-30 RX ADMIN — FENTANYL CITRATE 50 MCG: 50 INJECTION, SOLUTION INTRAMUSCULAR; INTRAVENOUS at 11:42

## 2020-11-30 RX ADMIN — DEXAMETHASONE SODIUM PHOSPHATE 4 MG: 4 INJECTION, SOLUTION INTRA-ARTICULAR; INTRALESIONAL; INTRAMUSCULAR; INTRAVENOUS; SOFT TISSUE at 11:49

## 2020-11-30 RX ADMIN — SODIUM CHLORIDE: 9 INJECTION, SOLUTION INTRAVENOUS at 11:10

## 2020-11-30 RX ADMIN — ETOMIDATE 20 MG: 2 INJECTION INTRAVENOUS at 11:34

## 2020-11-30 RX ADMIN — CEFAZOLIN SODIUM 2 G: 1 INJECTION, POWDER, FOR SOLUTION INTRAMUSCULAR; INTRAVENOUS at 11:40

## 2020-11-30 RX ADMIN — LIDOCAINE HYDROCHLORIDE 100 MG: 20 INJECTION, SOLUTION EPIDURAL; INFILTRATION; INTRACAUDAL; PERINEURAL at 11:34

## 2020-11-30 RX ADMIN — METOCLOPRAMIDE HYDROCHLORIDE 10 MG: 5 INJECTION INTRAMUSCULAR; INTRAVENOUS at 11:32

## 2020-11-30 RX ADMIN — SODIUM CHLORIDE 20 ML/HR: 9 INJECTION, SOLUTION INTRAVENOUS at 09:55

## 2020-11-30 NOTE — H&P
UROLOGY HISTORY AND PHYSICAL  Yonatan Magaña, [address] [facility] [employer] Office    Patient: Jerson Newman MRN: [id number]  SSN: [id number]    YOB: 1945  Age: 66 y.o. 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 The neobladder mucosa normal in appearance. There was an adherent calculus to the wall near the afferent portion. It was jagged/ crystalline, about 5mm. See the problem list.     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 He did not see gross blood. UA micro with 3-10 RBC/hpf on 9/23/2020. CT 11/6/2020: Patient status post cystectomy and creation of a neobladder. No  evidence of hydronephrosis. No stones are identified. Bladder cancer [facility]) ICD-10-CM: C67.9  ICD-9-CM: 188.9  9/22/2020    Overview Signed 9/22/2020  4:19 PM by Xavier Scott NP     He had a cystoprostatectomy 1998 with Dr. Anant Chamberlain. He has a neobladder. History of artificial bladder ICD-10-CM: Z96.0  ICD-9-CM: V43.5  8/7/2020    Overview Signed 9/22/2020  4:18 PM by Xavier Scott NP     He has a neobladder via Dr. Anant Chamberlain in [address] [place] [address].              Incomplete emptying of bladder ICD-10-CM: R33.9  ICD-9-CM: 788.21  8/7/2020    Overview Signed 9/22/2020  4:14 PM by Odalis De La O NP     He has a neobladder. He is not routinely self catheterizing. He voids with strain, but can sense when his bladder is full. He empties well with valsalva voiding. Urinary tract infectious disease ICD-10-CM: N39.0  ICD-9-CM: 599.0  8/7/2020    Overview Addendum 11/11/2020 11:27 AM by Odalis De La O NP     He has recurrent UTI with neobladder. Cutaneous abscess of back excluding buttocks ICD-10-CM: L02.212  ICD-9-CM: 682.2  3/13/2018    Overview Addendum 4/2/2018  9:25 AM by Johnson Anglin MD     S/P I&D; right upper back. Epidermal inclusion cyst ICD-10-CM: L72.0  ICD-9-CM: 706.2  3/2/2018    Overview Addendum 4/2/2018  9:25 AM by Johnson Anglin MD     S/P excision; right upper back. Cervical spinal stenosis ICD-10-CM: M48.02  ICD-9-CM: 723.0  5/12/2015        Cervical stenosis of spine ICD-10-CM: M48.02  ICD-9-CM: 723.0  5/12/2015        Carotid atherosclerosis ICD-10-CM: I65.29  ICD-9-CM: 433.10  2/22/2011    Overview Signed 2/22/2011 10:10 AM by Ho Ventura     Mild left internal carotid artery stenosis 10%-49% by duplex scan 11/09 and 2/11. PAD (peripheral artery disease) ([facility] [address] ICD-10-CM: I73.9  ICD-9-CM: 443.9  Unknown    Overview Signed 2/22/2011 10:11 AM by Twin Georges III     Peripheral vascular disease status post bilateral iliac stenting March 2009. CKD (chronic kidney disease) ICD-10-CM: N18.9  ICD-9-CM: 585. 9  Unknown    Overview Signed 9/22/2020  4:19 PM by Odalis De La O NP     Followed by Nephrology [facility] [place] [facility]). CKD, stage 3 due to presumed hypertensive nephrosclerosis.                NIDDM (non-insulin dependent diabetes mellitus) ICD-9-CM: 250.00  Unknown        Other primary cardiomyopathies ICD-10-CM: I42.8  ICD-9-CM: 425.4  10/18/2010    Overview Addendum 9/26/2011 10:30 AM by Ho Ventura     History of mild cardiomyopathy diagnosed in 2005 with most recent adenosine cardiolite November 2009 without evidence of myocardial ischemia and EF of 62% and with echocardiogram September 2011 with EF of 50%-55%. Mixed hyperlipidemia ICD-10-CM: E78.2  ICD-9-CM: 272.2  10/18/2010        Essential hypertension, benign ICD-10-CM: I10  ICD-9-CM: 401.1  10/18/2010               Past Medical History:  No Known Allergies   Prior to Admission medications    Medication Sig Start Date End Date Taking? Authorizing Provider   cyanocobalamin (Vitamin B-12) 100 mcg tablet Take 1,000 mcg by mouth daily. Yes Provider, Historical   ferrous sulfate 325 mg (65 mg iron) tablet 325 mg Daily (before breakfast). Yes Provider, Historical   bumetanide (BUMEX) 1 mg tablet TAKE 1 TABLET BY MOUTH EVERY DAY  Patient taking differently: Take 1 mg by mouth daily. 1/22/19  Yes Valery Avalos MD   simvastatin (ZOCOR) 40 mg tablet Take 1 Tab by mouth daily. 11/6/18  Yes Valery Avalos MD   oxyCODONE-acetaminophen (PERCOCET) 5-325 mg per tablet Take 1 Tab by mouth every four (4) hours as needed for Pain. Max Daily Amount: 6 Tabs. 3/19/18  Yes Unique Greenberg MD   PSYLLIUM HUSK (METAMUCIL PO) Take 3 Caps by mouth daily. Yes Provider, Historical   cholecalciferol, vitamin D3, 2,000 unit tab Take 1,000 Units by mouth daily. Yes Provider, Historical   aspirin delayed-release 81 mg tablet Take 81 mg by mouth daily. Yes Provider, Historical   lisinopril (PRINIVIL, ZESTRIL) 5 mg tablet Take 5 mg by mouth daily. Yes Provider, Historical   insulin NPH (HUMULIN N) 100 unit/mL injection by SubCUTAneous route Before breakfast and dinner. Morning 50 units, Evening 40 units   Yes Provider, Historical   sodium bicarbonate 650 mg tablet Take 1,300 mg by mouth two (2) times a day. Yes Provider, Historical   Omeprazole delayed release (PRILOSEC D/R) 20 mg tablet Take 20 mg by mouth daily.    Yes Provider, Historical   INSULIN LISPRO (HUMALOG SC) 15-20 Units by SubCUTAneous route Before breakfast, lunch, and dinner. 15 units morning and noon, 20 units with dinner   Yes Provider, Historical      PMHx:  has a past medical history of Arrhythmia, Arthritis, Bladder stones, Cancer ([facility] [address] (), Carotid atherosclerosis (2011), Chronic obstructive pulmonary disease ([facility] [address] Chronic pain, CKD (chronic kidney disease), GERD (gastroesophageal reflux disease), Hematuria, History of artificial bladder (2020), Hypercholesterolemia, Hypertension, Ill-defined condition, Incomplete emptying of bladder (2020), NIDDM (non-insulin dependent diabetes mellitus), PAD (peripheral artery disease) ([facility] [address] Unspecified sleep apnea, and Urinary tract infectious disease (2020). PSurgHx:  has a past surgical history that includes vascular surgery procedure unlist (Bilateral); hx cataract removal (Bilateral); hx shoulder replacement (Left, ); hx knee arthroscopy (Left); hx orthopaedic (Left); hx knee replacement (Left, 2017); hx cervical fusion (2015); hx prostatectomy (); pr cardiac surg procedure unlist; pr cystectomy,ileal conduit/sigmoid bladder (); hx urological; hx other surgical (Left, ); and hx other surgical.  PSocHx:  reports that he quit smoking about 21 years ago. He quit after 40.00 years of use. He has never used smokeless tobacco. He reports previous alcohol use. He reports that he does not use drugs. ROS:  Admission ROS by Saniya Singh MD from 2020 were reviewed with the patient and changes (other than per HPI) include: none. Physical Exam:            Vitals[de-identified]    Temp (24hrs), Av.5 °F (36.4 °C), Min:97.5 °F (36.4 °C), Max:97.5 °F (36.4 °C)   Blood pressure 125/72, pulse 70, temperature 97.5 °F (36.4 °C), resp. rate 16, SpO2 96 %. Estimated body mass index is 30.74 kg/m² as calculated from the following:    Height as of 20: 5' 5.35\" (1.66 m). Weight as of 20: 186 lb 11.7 oz (84.7 kg).              I&O's:    No intake/output data recorded. General Well developed, in NAD   Conjunctiva/Lids Normal without gross defects   Neck Supple without obvious, masses   Respiratory Effort Breathing easily, no audible wheezing, rhonchi, stridor   CV RRR   Abdomen / Flank Soft, non tender without guarding or rebound, without obvious masses, no CVA tenderness   Neurologic Grossly normal without focal deficits           Assessment/Plan:  Small bladder stone in neobladder. Plan on laser litholapaxy. The patient was counseled on the risks, benefits and expected course of surgery. Surgery has risks of bleeding, infection, injury, pain, death or other consequences. Some specific risks of surgery were discussed as well.        Cystourethroscopy  litholapaxy    Signed By: Ananda Stone MD  - November 30, 2020

## 2020-11-30 NOTE — OP NOTES
The patient was in the lithotomy position. Using a 17. 5French cystoscope with 30 degree lens complete cystoscopy was performed. The urethra was unremarkable. There was no prostate. The neobladder had normally rugated mucosa. The afferent limb had a widely patent orifice. There was an adherent stone about 5mm on the anterior aspect. The bladder was drained and irrigated. Using a 400micron end fire laser, with a Holmium laser, the stone was systematically obliterated. No foreign body was identified. No perforation was seen. The stone fragments were flushed out. The bladder was drained and the scope was removed. The patient tolerated the procedure well.

## 2020-11-30 NOTE — PROGRESS NOTES
Dr. Quynh Swenson made aware of patient's potassium. States fine to proceed. No new orders received.

## 2020-11-30 NOTE — ANESTHESIA POSTPROCEDURE EVALUATION
Procedure(s):  Cystourethroscopy & Laser Litholapaxy Holmium.     general    Anesthesia Post Evaluation        Patient location during evaluation: PACU  Patient participation: complete - patient participated  Level of consciousness: awake and alert  Pain management: adequate  Airway patency: patent  Anesthetic complications: no  Cardiovascular status: hemodynamically stable  Respiratory status: spontaneous ventilation and unassisted  Hydration status: acceptable  Post anesthesia nausea and vomiting:  controlled  Final Post Anesthesia Temperature Assessment:  Normothermia (36.0-37.5 degrees C)      INITIAL Post-op Vital signs:   Vitals Value Taken Time   /64 11/30/2020 12:23 PM   Temp 37.3 °C (99.1 °F) 11/30/2020 12:10 PM   Pulse 62 11/30/2020 12:23 PM   Resp 18 11/30/2020 12:23 PM   SpO2 97 % 11/30/2020 12:23 PM

## 2020-11-30 NOTE — ANESTHESIA PREPROCEDURE EVALUATION
Relevant Problems   No relevant active problems       Anesthetic History               Review of Systems / Medical History  Patient summary reviewed, nursing notes reviewed and pertinent labs reviewed    Pulmonary    COPD    Sleep apnea           Neuro/Psych             Comments: CAROTID ATHEROSCLEROSIS  NUMBNESS / STIFFNESS IN FEET. Cardiovascular    Hypertension        Dysrhythmias : atrial fibrillation  PAD      Comments: ECHO (8-4-20) EF 56%  S/P WATCHMAN IMPLANT. GI/Hepatic/Renal     GERD    Renal disease: CRI       Endo/Other    Diabetes    Cancer    Comments: NIGHT SWEATS Other Findings   Comments: HX OF ARTIFICIAL BLADDER (NEOBLADDER)  HEMATURIA  INCOMPLETE EMPTYING OF BLADDER.    BLADDER STONES  S/p C5-6 ACDF         Physical Exam    Airway  Mallampati: I  TM Distance: > 6 cm  Neck ROM: decreased range of motion   Mouth opening: Normal     Cardiovascular      Rate: normal         Dental    Dentition: Edentulous     Pulmonary      Decreased breath sounds           Abdominal         Other Findings            Anesthetic Plan    ASA: 3  Anesthesia type: general          Induction: Intravenous  Anesthetic plan and risks discussed with: Patient

## 2020-12-01 ENCOUNTER — HOSPITAL ENCOUNTER (EMERGENCY)
Age: 75
Discharge: HOME | End: 2020-12-02
Payer: MEDICARE

## 2020-12-01 DIAGNOSIS — N99.89 POSTOPERATIVE RETENTION OF URINE: Primary | ICD-10-CM

## 2020-12-01 DIAGNOSIS — R33.8 POSTOPERATIVE RETENTION OF URINE: Primary | ICD-10-CM

## 2020-12-01 LAB
ALBUMIN SERPL-MCNC: 3.4 G/DL (ref 3.5–5)
ALBUMIN/GLOB SERPL: 0.9 {RATIO} (ref 1.1–2.2)
ALP SERPL-CCNC: 105 U/L (ref 45–117)
ALT SERPL-CCNC: 20 U/L (ref 12–78)
ANION GAP SERPL CALC-SCNC: 6 MMOL/L (ref 5–15)
AST SERPL W P-5'-P-CCNC: 37 U/L (ref 15–37)
BASOPHILS # BLD: 0 K/UL (ref 0–0.1)
BASOPHILS NFR BLD: 0 % (ref 0–1)
BILIRUB SERPL-MCNC: 0.8 MG/DL (ref 0.2–1)
BUN SERPL-MCNC: 30 MG/DL (ref 6–20)
BUN/CREAT SERPL: 17 (ref 12–20)
CA-I BLD-MCNC: 8.5 MG/DL (ref 8.5–10.1)
CHLORIDE SERPL-SCNC: 107 MMOL/L (ref 97–108)
CO2 SERPL-SCNC: 25 MMOL/L (ref 21–32)
CREAT SERPL-MCNC: 1.79 MG/DL (ref 0.7–1.3)
DIFFERENTIAL METHOD BLD: ABNORMAL
EOSINOPHIL # BLD: 0.1 K/UL (ref 0–0.4)
EOSINOPHIL NFR BLD: 1 % (ref 0–7)
ERYTHROCYTE [DISTWIDTH] IN BLOOD BY AUTOMATED COUNT: 15.5 % (ref 11.5–14.5)
GLOBULIN SER CALC-MCNC: 4 G/DL (ref 2–4)
GLUCOSE SERPL-MCNC: 173 MG/DL (ref 65–100)
HCT VFR BLD AUTO: 41.7 % (ref 36.6–50.3)
HGB BLD-MCNC: 13.6 G/DL (ref 12.1–17)
IMM GRANULOCYTES # BLD AUTO: 0 K/UL (ref 0–0.04)
IMM GRANULOCYTES NFR BLD AUTO: 0 % (ref 0–0.5)
LYMPHOCYTES # BLD: 2.6 K/UL (ref 0.8–3.5)
LYMPHOCYTES NFR BLD: 29 % (ref 12–49)
MCH RBC QN AUTO: 29.9 PG (ref 26–34)
MCHC RBC AUTO-ENTMCNC: 32.6 G/DL (ref 30–36.5)
MCV RBC AUTO: 91.6 FL (ref 80–99)
MONOCYTES # BLD: 0.5 K/UL (ref 0–1)
MONOCYTES NFR BLD: 6 % (ref 5–13)
NEUTS SEG # BLD: 5.7 K/UL (ref 1.8–8)
NEUTS SEG NFR BLD: 64 % (ref 32–75)
NRBC # BLD: 0 K/UL (ref 0–0.01)
NRBC BLD-RTO: 0 PER 100 WBC
PLATELET # BLD AUTO: 150 K/UL (ref 150–400)
PMV BLD AUTO: 9.8 FL (ref 8.9–12.9)
POTASSIUM SERPL-SCNC: 4.3 MMOL/L (ref 3.5–5.1)
PROT SERPL-MCNC: 7.4 G/DL (ref 6.4–8.2)
RBC # BLD AUTO: 4.55 M/UL (ref 4.1–5.7)
SODIUM SERPL-SCNC: 138 MMOL/L (ref 136–145)
WBC # BLD AUTO: 9 K/UL (ref 4.1–11.1)

## 2020-12-01 PROCEDURE — 81001 URINALYSIS AUTO W/SCOPE: CPT

## 2020-12-01 PROCEDURE — 51702 INSERT TEMP BLADDER CATH: CPT

## 2020-12-01 PROCEDURE — 80053 COMPREHEN METABOLIC PANEL: CPT

## 2020-12-01 PROCEDURE — 85025 COMPLETE CBC W/AUTO DIFF WBC: CPT

## 2020-12-01 PROCEDURE — 36415 COLL VENOUS BLD VENIPUNCTURE: CPT

## 2020-12-01 PROCEDURE — 99284 EMERGENCY DEPT VISIT MOD MDM: CPT

## 2020-12-01 PROCEDURE — 87086 URINE CULTURE/COLONY COUNT: CPT

## 2020-12-02 ENCOUNTER — OFFICE VISIT (OUTPATIENT)
Dept: UROLOGY | Age: 75
End: 2020-12-02
Payer: MEDICARE

## 2020-12-02 VITALS
OXYGEN SATURATION: 99 % | RESPIRATION RATE: 18 BRPM | BODY MASS INDEX: 29.41 KG/M2 | DIASTOLIC BLOOD PRESSURE: 80 MMHG | TEMPERATURE: 97.9 F | SYSTOLIC BLOOD PRESSURE: 130 MMHG | HEIGHT: 66 IN | WEIGHT: 183 LBS

## 2020-12-02 VITALS — HEIGHT: 66 IN | WEIGHT: 183 LBS | BODY MASS INDEX: 29.41 KG/M2

## 2020-12-02 DIAGNOSIS — N21.0 BLADDER STONE: ICD-10-CM

## 2020-12-02 DIAGNOSIS — R33.9 INCOMPLETE EMPTYING OF BLADDER: ICD-10-CM

## 2020-12-02 DIAGNOSIS — C67.9 MALIGNANT NEOPLASM OF URINARY BLADDER, UNSPECIFIED SITE (HCC): Primary | ICD-10-CM

## 2020-12-02 DIAGNOSIS — Z96.0: ICD-10-CM

## 2020-12-02 LAB
APPEARANCE UR: CLEAR
BACTERIA URNS QL MICRO: NEGATIVE /HPF
BILIRUB UR QL: NEGATIVE
COLOR UR: ABNORMAL
GLUCOSE UR STRIP.AUTO-MCNC: NEGATIVE MG/DL
HGB UR QL STRIP: ABNORMAL
KETONES UR QL STRIP.AUTO: NEGATIVE MG/DL
LEUKOCYTE ESTERASE UR QL STRIP.AUTO: NEGATIVE
NITRITE UR QL STRIP.AUTO: NEGATIVE
PH UR STRIP: 7 [PH] (ref 5–8)
PROT UR STRIP-MCNC: NEGATIVE MG/DL
RBC #/AREA URNS HPF: ABNORMAL /HPF (ref 0–5)
SP GR UR REFRACTOMETRY: 1.01 (ref 1–1.03)
UA: UC IF INDICATED,UAUC: ABNORMAL
UROBILINOGEN UR QL STRIP.AUTO: 0.1 EU/DL (ref 0.1–1)
WBC URNS QL MICRO: ABNORMAL /HPF (ref 0–4)

## 2020-12-02 PROCEDURE — 51700 IRRIGATION OF BLADDER: CPT | Performed by: UROLOGY

## 2020-12-02 PROCEDURE — 99214 OFFICE O/P EST MOD 30 MIN: CPT | Performed by: UROLOGY

## 2020-12-02 PROCEDURE — G8427 DOCREV CUR MEDS BY ELIG CLIN: HCPCS | Performed by: UROLOGY

## 2020-12-02 NOTE — ED TRIAGE NOTES
Patient reports that he had a procedure done with Dr. Farooq Dick yesterday for bladder stones. Patient reports that since noon today he has been unable to void. Spoke with Dr. Tamiko Garcia office who advised he come to the ED. Patient also reports h/o bladder cancer years ago and has a reconstructed bladder from that.

## 2020-12-02 NOTE — LETTER
12/2/20 Patient: Deborah Alves YOB: 1945 Date of Visit: 12/2/2020 Amanda Ramírez DO 
[address] [facility] [address] VIA Facsimile: [phone] Dear Amanda Ramírez DO, Thank you for referring Mr. Sumaya Dunbar to [place] [address] for evaluation. My notes for this consultation are attached. If you have questions, please do not hesitate to call me. I look forward to following your patient along with you.  
 
 
Sincerely, 
 
Saniya Singh MD

## 2020-12-02 NOTE — PROGRESS NOTES
HISTORY OF PRESENT ILLNESS  Cortney Richardson is a 66 y.o. male. Chief Complaint   Patient presents with    Follow-up    Urinary Retention     He is status post a laser litholapaxy of a small bladder stone on 11/30/2020. Yesterday he was unable to void. He has a neobladder. He is not routinely self catheterizing. He voids with strain, but can sense when his bladder is full. He empties well with valsalva voiding. He was unable to self-cath or void for a period of 7 hours (starting around noon on 12/1/2020) following his laser litho on 11/30/2020 (bladder stone). He had some mucus buildup and his self catheters were getting plugged. He did not see blood in the urine but then noticed a small blood clot. Carmelo Bees He was unable to drain his bladder. He thinks it was because his catheters were too small to drain the mucus. He reported that he was unable to void, or self-cath and noted blood/blood clots. It was just a few and it has not returned. He called the on-call service and spoke to my nurse practitioner. He was directed to the ER. He had a Sykes catheter placed and is draining clear urine. He had some mucus drainage initially but that stopped. His sykes is clear like water. Chronic Conditions Addressed Today     1. History of artificial bladder     Overview      He has a neobladder via Dr. Isabell Loo in 1998. Current Assessment & Plan      He Valsalva voids and uses a catheter only as needed. He has retention likely related to a small blood clot mucus. It appears that is clear         2. Incomplete emptying of bladder     Overview      He has a neobladder. He is not routinely self catheterizing. He voids with strain, but can sense when his bladder is full. He empties well with valsalva voiding. He was unable to self-cath or void for a period of 7 hours following his laser litho on 11/30/2020 (bladder stone).   He was encouraged to go to the ER overnight on 12/1/2020 for clot irrigation, which was performed at [facility] ER. Current Assessment & Plan      He was able to void today after his mucus is cleared. 3. Bladder cancer ([address] - Primary     Overview      He had a cystoprostatectomy 1998 with Dr. Judge Christopher. He has a neobladder. Current Assessment & Plan      He is status post cystectomy in 1998. He had no recurrence to date. He has a neobladder. Key Pain Meds     The patient is on no pain meds. Lab Results   Component Value Date/Time    WBC 9.0 12/01/2020 08:36 PM    ABS. NEUTROPHILS 5.7 12/01/2020 08:36 PM    HGB 13.6 12/01/2020 08:36 PM    HCT 41.7 12/01/2020 08:36 PM    PLATELET 899 34/94/7528 08:36 PM    Creatinine 1.79 12/01/2020 08:36 PM    BUN 30 12/01/2020 08:36 PM    ALT (SGPT) 20 12/01/2020 08:36 PM    Albumin 3.4 12/01/2020 08:36 PM            4. Bladder stone     Overview      He is s/p cysto and laser litholapaxy on 11/30/2020. Operative findings as follows: the neobladder had normally rugated mucosa. The afferent limb had a widely patent orifice. There was an adherent stone about 5mm on the anterior aspect. That stone was systematically obliterated and flushed out. Current Assessment & Plan      Status post removal of a bladder stone on 11/30/2020. Review of Systems   All other systems reviewed and are negative. Past Medical History:   Diagnosis Date    Arrhythmia     Atrial fibrillation.  Arthritis     Bladder stones     Cancer [facility]) 1999    bladder    Carotid atherosclerosis 2/22/2011    Chronic kidney disease     Chronic obstructive pulmonary disease (HCC)     Chronic pain     left shoulder--resolved after shoulder replacement    CKD (chronic kidney disease)     Stage 3    GERD (gastroesophageal reflux disease)     Hematuria     History of artificial bladder 8/7/2020    Hypercholesterolemia     Hyperlipidemia.     Hypertension     Ill-defined condition     hyperlipidemia    Incomplete emptying of bladder 8/7/2020    NIDDM (non-insulin dependent diabetes mellitus)     PAD [facility]per[facility]) [facility]     Unspecified sleep apnea     non compliant with CPAP    Urinary tract infectious disease 8/7/2020      Past Surgical History:   Procedure Laterality Date    CARDIAC SURG PROCEDURE UNLIST      heart procedure     CYSTECTOMY,ILEAL CONDUIT/SIGMOID BLADDER  1999    HX CATARACT REMOVAL Bilateral     HX CERVICAL FUSION  05/2015    C5-6    HX KNEE ARTHROSCOPY Left     repair torn meniscus    HX KNEE REPLACEMENT Left 02/2017    HX ORTHOPAEDIC Left     remove heel spur    HX OTHER SURGICAL Left 2011    remove benign growth on neck    HX OTHER SURGICAL      Excision of cyst with incision and drainage of abscess of the right upper back. [address]    removed with bladder    HX SHOULDER REPLACEMENT Left 2012    HX UROLOGICAL      cystoscopy     VASCULAR SURGERY PROCEDURE UNLIST Bilateral     BLE stents     Family History   Problem Relation Age of Onset    Heart Disease Mother     Cancer Father         rectal    Cancer Sister         colon    Diabetes Brother         Physical Exam  Constitutional:       Appearance: Normal appearance. HENT:      Head: Normocephalic and atraumatic. Nose: Nose normal.   Eyes:      Extraocular Movements: Extraocular movements intact. Conjunctiva/sclera: Conjunctivae normal.   Pulmonary:      Effort: Pulmonary effort is normal.   Abdominal:      General: Abdomen is flat. There is no distension. Palpations: Abdomen is soft. There is no mass. Tenderness: There is no abdominal tenderness. There is no guarding or rebound. Genitourinary:     Comments: Hernandez catheter draining clear urine  Neurological:      Mental Status: He is alert and oriented to person, place, and time.    Psychiatric:         Mood and Affect: Mood normal.         Behavior: Behavior normal.         He underwent a fill and pull voiding trial and was able to void easily. ASSESSMENT and PLAN  Diagnoses and all orders for this visit:    1. Malignant neoplasm of urinary bladder, unspecified site [facility])  Assessment & Plan:  He is status post cystectomy in 1998. He had no recurrence to date. He has a neobladder. Key Pain Meds     The patient is on no pain meds. Lab Results   Component Value Date/Time    WBC 9.0 12/01/2020 08:36 PM    ABS. NEUTROPHILS 5.7 12/01/2020 08:36 PM    HGB 13.6 12/01/2020 08:36 PM    HCT 41.7 12/01/2020 08:36 PM    PLATELET 099 59/30/6757 08:36 PM    Creatinine 1.79 12/01/2020 08:36 PM    BUN 30 12/01/2020 08:36 PM    ALT (SGPT) 20 12/01/2020 08:36 PM    Albumin 3.4 12/01/2020 08:36 PM         2. Bladder stone  Assessment & Plan:  Status post removal of a bladder stone on 11/30/2020.      3. Incomplete emptying of bladder  Assessment & Plan:  He was able to void today after his mucus is cleared. 4. History of artificial bladder  Assessment & Plan:  He Valsalva voids and uses a catheter only as needed. He has retention likely related to a small blood clot mucus.   It appears that is clear      Other orders  -     Shanti Land MD

## 2020-12-02 NOTE — ASSESSMENT & PLAN NOTE
He Valsalva voids and uses a catheter only as needed. He has retention likely related to a small blood clot mucus.   It appears that is clear

## 2020-12-02 NOTE — ED PROVIDER NOTES
EMERGENCY DEPARTMENT HISTORY AND PHYSICAL EXAM      Date: 12/1/2020  Patient Name: Mojgan Richardson    History of Presenting Illness     Chief Complaint   Patient presents with    Other       History Provided By: Patient    HPI: Aga Giles, 66 y.o. male with a past medical history significant for DM, PAD, CKD, HTN, hypercholesteremia, bladder cancer presents to the ED with cc of inability to void since 12 noon today. Patient states he had a lithotripsy completed yesterday by Dr. Frederic Dejesus. He states he has attempted to self cath several times today but has been unable secondary to blood clots. He contacted his urologist who referred him to the ED for Hernandez insertion with irrigation. He reports some associated lower back pain but declines any medication at this time. He denies any fever/chills or abdominal pain. There are no other complaints, changes, or physical findings at this time. PCP: Johan Arevalo, DO    No current facility-administered medications on file prior to encounter. Current Outpatient Medications on File Prior to Encounter   Medication Sig Dispense Refill    cyanocobalamin (Vitamin B-12) 100 mcg tablet Take 1,000 mcg by mouth daily.  bumetanide (BUMEX) 1 mg tablet TAKE 1 TABLET BY MOUTH EVERY DAY (Patient taking differently: Take 1 mg by mouth daily.) 90 Tab 0    simvastatin (ZOCOR) 40 mg tablet Take 1 Tab by mouth daily. 90 Tab 1    cholecalciferol, vitamin D3, 2,000 unit tab Take 1,000 Units by mouth daily.  aspirin delayed-release 81 mg tablet Take 81 mg by mouth daily.  insulin NPH (HUMULIN N) 100 unit/mL injection by SubCUTAneous route Before breakfast and dinner. Morning 50 units, Evening 40 units      sodium bicarbonate 650 mg tablet Take 1,300 mg by mouth two (2) times a day.  Omeprazole delayed release (PRILOSEC D/R) 20 mg tablet Take 20 mg by mouth daily.       INSULIN LISPRO (HUMALOG SC) 15-20 Units by SubCUTAneous route Before breakfast, lunch, and dinner. 15 units morning and noon, 20 units with dinner         Past History     Past Medical History:  Past Medical History:   Diagnosis Date    Arrhythmia     Atrial fibrillation.  Arthritis     Bladder stones     Cancer [facility]) 1999    bladder    Carotid atherosclerosis 2/22/2011    Chronic kidney disease     Chronic obstructive pulmonary disease (HCC)     Chronic pain     left shoulder--resolved after shoulder replacement    CKD (chronic kidney disease)     Stage 3    GERD (gastroesophageal reflux disease)     Hematuria     History of artificial bladder 8/7/2020    Hypercholesterolemia     Hyperlipidemia.  Hypertension     Ill-defined condition     hyperlipidemia    Incomplete emptying of bladder 8/7/2020    NIDDM (non-insulin dependent diabetes mellitus)     [facility]) [facility]     Unspecified sleep apnea     non compliant with CPAP    Urinary tract infectious disease 8/7/2020       Past Surgical History:  Past Surgical History:   Procedure Laterality Date    CARDIAC SURG PROCEDURE UNLIST      heart procedure     CYSTECTOMY,ILEAL CONDUIT/SIGMOID BLADDER  1999    HX CATARACT REMOVAL Bilateral     HX CERVICAL FUSION  05/2015    C5-6    HX KNEE ARTHROSCOPY Left     repair torn meniscus    HX KNEE REPLACEMENT Left 02/2017    HX ORTHOPAEDIC Left     remove heel spur    HX OTHER SURGICAL Left 2011    remove benign growth on neck    HX OTHER SURGICAL      Excision of cyst with incision and drainage of abscess of the right upper back.     HX PROSTATECTOMY  1999    removed with bladder    HX SHOULDER REPLACEMENT Left 2012    HX UROLOGICAL      cystoscopy     HX UROLOGICAL  11/30/2020    cystourethroscopy     HX UROLOGICAL  11/30/2020    laser litholapaxy holmium    VASCULAR SURGERY PROCEDURE UNLIST Bilateral     BLE stents       Family History:  Family History   Problem Relation Age of Onset    Heart Disease Mother     Cancer Father         rectal    Cancer Sister         colon    Diabetes Brother        Social History:  Social History     Tobacco Use    Smoking status: Former Smoker     Years: 40.00     Last attempt to quit: 1999     Years since quittin.6    Smokeless tobacco: Never Used   Substance Use Topics    Alcohol use: Not Currently    Drug use: Never       Allergies:  No Known Allergies      Review of Systems     Review of Systems   Constitutional: Negative. Negative for chills, fatigue and fever. Respiratory: Negative. Negative for cough and shortness of breath. Cardiovascular: Negative. Negative for chest pain and palpitations. Gastrointestinal: Negative. Negative for abdominal pain, diarrhea, nausea and vomiting. Genitourinary: Positive for difficulty urinating. Negative for hematuria. Musculoskeletal: Positive for back pain. Neurological: Negative. Negative for dizziness and headaches. All other systems reviewed and are negative. Physical Exam     Physical Exam  Vitals signs and nursing note reviewed. Constitutional:       General: He is not in acute distress. Appearance: Normal appearance. HENT:      Head: Normocephalic and atraumatic. Eyes:      Extraocular Movements: Extraocular movements intact. Conjunctiva/sclera: Conjunctivae normal.   Neck:      Musculoskeletal: Normal range of motion and neck supple. Cardiovascular:      Rate and Rhythm: Normal rate and regular rhythm. Heart sounds: Normal heart sounds. Pulmonary:      Effort: Pulmonary effort is normal.      Breath sounds: Normal breath sounds. No wheezing or rales. Abdominal:      General: Bowel sounds are normal. There is distension. Palpations: Abdomen is soft. Tenderness: There is no abdominal tenderness. Musculoskeletal: Normal range of motion. Skin:     General: Skin is warm and dry. Neurological:      General: No focal deficit present. Mental Status: He is alert.    Psychiatric:         Mood and Affect: Mood normal.         Behavior: Behavior normal. Behavior is cooperative. Lab and Diagnostic Study Results     Labs -     Admission on 12/01/2020, Discharged on 12/02/2020   Component Date Value    WBC 12/01/2020 9.0     RBC 12/01/2020 4.55     HGB 12/01/2020 13.6     HCT 12/01/2020 41.7     MCV 12/01/2020 91.6     MCH 12/01/2020 29.9     MCHC 12/01/2020 32.6     RDW 12/01/2020 15.5*    PLATELET 09/04/2780 009     MPV 12/01/2020 9.8     NRBC 12/01/2020 0.0     ABSOLUTE NRBC 12/01/2020 0.00     NEUTROPHILS 12/01/2020 64     LYMPHOCYTES 12/01/2020 29     MONOCYTES 12/01/2020 6     EOSINOPHILS 12/01/2020 1     BASOPHILS 12/01/2020 0     IMMATURE GRANULOCYTES 12/01/2020 0     ABS. NEUTROPHILS 12/01/2020 5.7     ABS. LYMPHOCYTES 12/01/2020 2.6     ABS. MONOCYTES 12/01/2020 0.5     ABS. EOSINOPHILS 12/01/2020 0.1     ABS. BASOPHILS 12/01/2020 0.0     ABS. IMM. GRANS. 12/01/2020 0.0     DF 12/01/2020 AUTOMATED     Sodium 12/01/2020 138     Potassium 12/01/2020 4.3     Chloride 12/01/2020 107     CO2 12/01/2020 25     Anion gap 12/01/2020 6     Glucose 12/01/2020 173*    BUN 12/01/2020 30*    Creatinine 12/01/2020 1.79*    BUN/Creatinine ratio 12/01/2020 17     GFR est AA 12/01/2020 45*    GFR est non-AA 12/01/2020 37*    Calcium 12/01/2020 8.5     Bilirubin, total 12/01/2020 0.8     AST (SGOT) 12/01/2020 37     ALT (SGPT) 12/01/2020 20     Alk.  phosphatase 12/01/2020 105     Protein, total 12/01/2020 7.4     Albumin 12/01/2020 3.4*    Globulin 12/01/2020 4.0     A-G Ratio 12/01/2020 0.9*    Color 12/01/2020 Yellow/Straw     Appearance 12/01/2020 Clear     Specific gravity 12/01/2020 1.010     pH (UA) 12/01/2020 7.0     Protein 12/01/2020 Negative     Glucose 12/01/2020 Negative     Ketone 12/01/2020 Negative     Bilirubin 12/01/2020 Negative     Blood 12/01/2020 Small*    Urobilinogen 12/01/2020 0.1     Nitrites 12/01/2020 Negative     Leukocyte Esterase 12/01/2020 Negative     UA:UC IF INDICATED 12/01/2020 Urine Culture Ordered*    WBC 12/01/2020 0-5     RBC 12/01/2020 5-10     Bacteria 12/01/2020 Negative     Special Requests: 12/01/2020                      Value:No Special Requests  Reflexed from [id number]      Culture result: 12/01/2020 No Growth (<1000 cfu/mL)        Radiologic Studies -   @lastxrresult@  CT Results  (Last 48 hours)    None        CXR Results  (Last 48 hours)    None            Medical Decision Making   - I am the first provider for this patient. - I reviewed the vital signs, available nursing notes, past medical history, past surgical history, family history and social history. - Initial assessment performed. The patients presenting problems have been discussed, and they are in agreement with the care plan formulated and outlined with them. I have encouraged them to ask questions as they arise throughout their visit. Vital Signs-Reviewed the patient's vital signs. No data found. Records Reviewed: Nursing Notes    The patient presents with dysuria with a differential diagnosis of obstruction, UTI    Provider Notes (Medical Decision Making):   Patient with history of bladder CA and recent lithotripsy with inability to void x7 hours, Hernandez catheter inserted by nursing without difficulty, immediate return of clear urine, patient denies any further back pain, UA negativewill send for culture, patient discharged with Hernandez leg bag and advised to contact urology in a.m. for follow-up. Disposition   Disposition: Condition stable and improved    DC- Pain Control DC Home plan: Referral Urology    Discharged    DISCHARGE PLAN:  1. Current Discharge Medication List      CONTINUE these medications which have NOT CHANGED    Details   cyanocobalamin (Vitamin B-12) 100 mcg tablet Take 1,000 mcg by mouth daily. ferrous sulfate 325 mg (65 mg iron) tablet 325 mg Daily (before breakfast).       bumetanide (BUMEX) 1 mg tablet TAKE 1 TABLET BY MOUTH EVERY DAY  Qty: 90 Tab, Refills: 0      simvastatin (ZOCOR) 40 mg tablet Take 1 Tab by mouth daily. Qty: 90 Tab, Refills: 1    Associated Diagnoses: Hypercholesteremia      oxyCODONE-acetaminophen (PERCOCET) 5-325 mg per tablet Take 1 Tab by mouth every four (4) hours as needed for Pain. Max Daily Amount: 6 Tabs. Qty: 10 Tab, Refills: 0    Associated Diagnoses: Cutaneous abscess of back excluding buttocks; Infected sebaceous cyst of skin      PSYLLIUM HUSK (METAMUCIL PO) Take 3 Caps by mouth daily. cholecalciferol, vitamin D3, 2,000 unit tab Take 1,000 Units by mouth daily. aspirin delayed-release 81 mg tablet Take 81 mg by mouth daily. lisinopril (PRINIVIL, ZESTRIL) 5 mg tablet Take 5 mg by mouth daily. insulin NPH (HUMULIN N) 100 unit/mL injection by SubCUTAneous route Before breakfast and dinner. Morning 50 units, Evening 40 units      sodium bicarbonate 650 mg tablet Take 1,300 mg by mouth two (2) times a day. Omeprazole delayed release (PRILOSEC D/R) 20 mg tablet Take 20 mg by mouth daily. INSULIN LISPRO (HUMALOG SC) 15-20 Units by SubCUTAneous route Before breakfast, lunch, and dinner. 15 units morning and noon, 20 units with dinner           2. Follow-up Information     Follow up With Specialties Details Why Contact Info    Fernanda Tiwari MD Urology Schedule an appointment as soon as possible for a visit   [address] [place] [id number]  [phone]      [facility] EMERGENCY DEPT Emergency Medicine  If symptoms worsen [address] [place], [place] [id number] [phone]        3. Return to ED if worse   4. Discharge Medication List as of 12/2/2020 12:19 AM            Diagnosis     Clinical Impression:   1. Postoperative retention of urine        Attestations:    Paulina Mazariegos NP    Please note that this dictation was completed with [employer] the [employer] voice recognition software.   Quite often unanticipated grammatical, syntax, homophones, and other interpretive errors are inadvertently transcribed by the computer software. Please disregard these errors. Please excuse any errors that have escaped final proofreading. Thank you.

## 2020-12-02 NOTE — ASSESSMENT & PLAN NOTE
He is status post cystectomy in 1998. He had no recurrence to date. He has a neobladder. Key Pain Meds     The patient is on no pain meds. Lab Results   Component Value Date/Time    WBC 9.0 12/01/2020 08:36 PM    ABS.  NEUTROPHILS 5.7 12/01/2020 08:36 PM    HGB 13.6 12/01/2020 08:36 PM    HCT 41.7 12/01/2020 08:36 PM    PLATELET 837 69/93/1318 08:36 PM    Creatinine 1.79 12/01/2020 08:36 PM    BUN 30 12/01/2020 08:36 PM    ALT (SGPT) 20 12/01/2020 08:36 PM    Albumin 3.4 12/01/2020 08:36 PM

## 2020-12-03 LAB
BACTERIA SPEC CULT: NORMAL
SPECIAL REQUESTS,SREQ: NORMAL

## 2020-12-23 ENCOUNTER — OFFICE VISIT (OUTPATIENT)
Dept: UROLOGY | Age: 75
End: 2020-12-23
Payer: MEDICARE

## 2020-12-23 VITALS
TEMPERATURE: 96.7 F | DIASTOLIC BLOOD PRESSURE: 68 MMHG | HEIGHT: 66 IN | BODY MASS INDEX: 29.25 KG/M2 | SYSTOLIC BLOOD PRESSURE: 110 MMHG | WEIGHT: 182 LBS

## 2020-12-23 DIAGNOSIS — N30.00 ACUTE CYSTITIS WITHOUT HEMATURIA: ICD-10-CM

## 2020-12-23 DIAGNOSIS — R33.9 INCOMPLETE EMPTYING OF BLADDER: ICD-10-CM

## 2020-12-23 DIAGNOSIS — Z96.0: ICD-10-CM

## 2020-12-23 DIAGNOSIS — N21.0 BLADDER STONE: ICD-10-CM

## 2020-12-23 LAB
BILIRUB UR QL STRIP: NEGATIVE
GLUCOSE UR-MCNC: NEGATIVE MG/DL
KETONES P FAST UR STRIP-MCNC: NEGATIVE MG/DL
PH UR STRIP: 7 [PH] (ref 4.6–8)
PROT UR QL STRIP: NORMAL
SP GR UR STRIP: 1.02 (ref 1–1.03)
UA UROBILINOGEN AMB POC: NORMAL (ref 0.2–1)
URINALYSIS CLARITY POC: CLEAR
URINALYSIS COLOR POC: YELLOW
URINE BLOOD POC: NEGATIVE
URINE LEUKOCYTES POC: NEGATIVE
URINE NITRITES POC: NEGATIVE

## 2020-12-23 PROCEDURE — 81003 URINALYSIS AUTO W/O SCOPE: CPT | Performed by: UROLOGY

## 2020-12-23 PROCEDURE — 51798 US URINE CAPACITY MEASURE: CPT | Performed by: UROLOGY

## 2020-12-23 PROCEDURE — 99214 OFFICE O/P EST MOD 30 MIN: CPT | Performed by: UROLOGY

## 2020-12-23 PROCEDURE — G8427 DOCREV CUR MEDS BY ELIG CLIN: HCPCS | Performed by: UROLOGY

## 2020-12-23 RX ORDER — LANOLIN ALCOHOL/MO/W.PET/CERES
CREAM (GRAM) TOPICAL
COMMUNITY

## 2020-12-23 NOTE — ASSESSMENT & PLAN NOTE
He has a neobladder. Lab Results   Component Value Date/Time    WBC 9.0 12/01/2020 08:36 PM    ABS.  NEUTROPHILS 5.7 12/01/2020 08:36 PM    HGB 13.6 12/01/2020 08:36 PM    HCT 41.7 12/01/2020 08:36 PM    PLATELET 689 28/56/0812 08:36 PM    Creatinine 1.79 12/01/2020 08:36 PM    BUN 30 12/01/2020 08:36 PM    ALT (SGPT) 20 12/01/2020 08:36 PM    Albumin 3.4 12/01/2020 08:36 PM

## 2020-12-23 NOTE — PROGRESS NOTES
HISTORY OF PRESENT ILLNESS  Katie Richardson is a 66 y.o. male. Chief Complaint   Patient presents with    Follow-up    [facility] [place] UTI     He had a bladder stone treated on 11/30/2020. He has a history of bladder cancer and neobladder. He Valsalva voids normally and self catheterizes as needed. He had urinary retention and was seen in the ER on 12/1/2020 and subsequently on 12/2/2020 with me. He thought his catheters were getting plugged with mucus. He does not like to use catheters. He feels he has to strain more to empty. He thinks he does not empty. He went to [facility] after our last visit. He had mucusy/ think urine. He had no fevers or pain. He was assessed to have a UTI. He was treated with an antibiotic. He feels his mucus has cleared after the antibiotic. Urinalysis today was clear. His PVR was 98 cc on bladder scan. Chronic Conditions Addressed Today     1. History of artificial bladder     Overview      He has a neobladder via Dr. Timoteo Babin in 1998. He normally Valsalva voids. He does not sense a full bladder. He does self catheterize as needed. Current Assessment & Plan      He normally Valsalva voids         2. Incomplete emptying of bladder     Overview      He has a neobladder. He is not routinely self catheterizing. He voids with strain, but can sense when his bladder is full. He empties well with valsalva voiding. He was unable to self-cath or void for a period of 7 hours following his laser litho on 11/30/2020 (bladder stone). He was encouraged to go to the ER overnight on 12/1/2020 for clot irrigation, which was performed at [facility] ER. Bladder scan PVR on 12/23/2020 was 98. Current Assessment & Plan      He does not have a persistent UTI today. He may just have chronic mucus production. He is instructed to hydrate well. He can self catheterize and flush out mucus as needed. He is emptying satisfactorily.           Relevant Medications     ferrous sulfate 325 mg (65 mg iron) tablet    3. Urinary tract infectious disease     Overview      He has recurrent UTI with neobladder. Current Assessment & Plan      Urinalysis today is clear. No further treatment at this time          Relevant Medications     ferrous sulfate 325 mg (65 mg iron) tablet    4. Bladder stone     Overview      He is s/p cysto and laser litholapaxy on 11/30/2020. Operative findings as follows: the neobladder had normally rugated mucosa. The afferent limb had a widely patent orifice. There was an adherent stone about 5mm on the anterior aspect. That stone was systematically obliterated and flushed out. Current Assessment & Plan      Treated with laser litholapaxy on 11/30/2020          Relevant Medications     ferrous sulfate 325 mg (65 mg iron) tablet            Review of Systems   All other systems reviewed and are negative. Past Medical History:   Diagnosis Date    Arrhythmia     Atrial fibrillation.  Arthritis     Bladder stones     Cancer [facility]) 1999    bladder    Carotid atherosclerosis 2/22/2011    Chronic kidney disease     Chronic obstructive pulmonary disease (HCC)     Chronic pain     left shoulder--resolved after shoulder replacement    CKD (chronic kidney disease)     Stage 3    GERD (gastroesophageal reflux disease)     Hematuria     History of artificial bladder 8/7/2020    Hypercholesterolemia     Hyperlipidemia.     Hypertension     Ill-defined condition     hyperlipidemia    Incomplete emptying of bladder 8/7/2020    NIDDM (non-insulin dependent diabetes mellitus)     [facility]per[facility]     Unspecified sleep apnea     non compliant with CPAP    Urinary tract infectious disease 8/7/2020      Past Surgical History:   Procedure Laterality Date    CARDIAC SURG PROCEDURE UNLIST      heart procedure     CYSTECTOMY,ILEAL CONDUIT/SIGMOID BLADDER  1999    HX CATARACT REMOVAL Bilateral     HX CERVICAL FUSION  05/2015    C5-6    HX KNEE ARTHROSCOPY Left     repair torn meniscus    HX KNEE REPLACEMENT Left 02/2017    HX ORTHOPAEDIC Left     remove heel spur    HX OTHER SURGICAL Left 2011    remove benign growth on neck    HX OTHER SURGICAL      Excision of cyst with incision and drainage of abscess of the right upper back. [address]    removed with bladder    HX SHOULDER REPLACEMENT Left 2012    HX UROLOGICAL      cystoscopy     HX UROLOGICAL  11/30/2020    cystourethroscopy     HX UROLOGICAL  11/30/2020    laser litholapaxy holmium    VASCULAR SURGERY PROCEDURE UNLIST Bilateral     BLE stents     Family History   Problem Relation Age of Onset    Heart Disease Mother     Cancer Father         rectal    Cancer Sister         colon    Diabetes Brother         Physical Exam  Constitutional:       Appearance: Normal appearance. HENT:      Head: Normocephalic and atraumatic. Nose: Nose normal.   Eyes:      Extraocular Movements: Extraocular movements intact. Conjunctiva/sclera: Conjunctivae normal.   Pulmonary:      Effort: Pulmonary effort is normal. No respiratory distress. Breath sounds: No stridor. Musculoskeletal:         General: No deformity. Skin:     General: Skin is warm and dry. Neurological:      Mental Status: He is alert and oriented to person, place, and time. Gait: Gait normal.   Psychiatric:         Mood and Affect: Mood normal.         Behavior: Behavior normal.                     ASSESSMENT and PLAN  Diagnoses and all orders for this visit:    1. Bladder stone  Assessment & Plan:  Treated with laser litholapaxy on 11/30/2020      2. Acute cystitis without hematuria  Assessment & Plan:  Urinalysis today is clear. No further treatment at this time      3. History of artificial bladder  Assessment & Plan:  He normally Valsalva voids      4. Incomplete emptying of bladder  Assessment & Plan:  He does not have a persistent UTI today.   He may just have chronic mucus production. He is instructed to hydrate well. He can self catheterize and flush out mucus as needed. He is emptying satisfactorily. Follow-up and Dispositions    · Return in about 1 year (around 12/23/2021).          Eva Zayas MD

## 2020-12-23 NOTE — ASSESSMENT & PLAN NOTE
He does not have a persistent UTI today. He may just have chronic mucus production. He is instructed to hydrate well. He can self catheterize and flush out mucus as needed. He is emptying satisfactorily.

## 2020-12-28 LAB — PVR POC: 98 CC

## 2022-03-18 PROBLEM — R31.29 MICROHEMATURIA: Status: ACTIVE | Noted: 2020-11-11

## 2022-03-18 PROBLEM — Z96.0: Status: ACTIVE | Noted: 2020-08-07

## 2022-03-18 PROBLEM — N39.0 URINARY TRACT INFECTIOUS DISEASE: Status: ACTIVE | Noted: 2020-08-07

## 2022-03-19 PROBLEM — L72.0 EPIDERMAL INCLUSION CYST: Status: ACTIVE | Noted: 2018-03-02

## 2022-03-19 PROBLEM — N21.0 BLADDER STONE: Status: ACTIVE | Noted: 2020-11-12

## 2022-03-19 PROBLEM — C67.9 BLADDER CANCER (HCC): Status: ACTIVE | Noted: 2020-09-22

## 2022-03-19 PROBLEM — R33.9 INCOMPLETE EMPTYING OF BLADDER: Status: ACTIVE | Noted: 2020-08-07

## 2022-03-20 PROBLEM — L02.212 CUTANEOUS ABSCESS OF BACK EXCLUDING BUTTOCKS: Status: ACTIVE | Noted: 2018-03-13

## 2023-01-08 ENCOUNTER — APPOINTMENT (OUTPATIENT)
Dept: CT IMAGING | Age: 78
End: 2023-01-08
Attending: STUDENT IN AN ORGANIZED HEALTH CARE EDUCATION/TRAINING PROGRAM
Payer: MEDICARE

## 2023-01-08 ENCOUNTER — HOSPITAL ENCOUNTER (EMERGENCY)
Age: 78
Discharge: HOME | End: 2023-01-08
Attending: STUDENT IN AN ORGANIZED HEALTH CARE EDUCATION/TRAINING PROGRAM
Payer: MEDICARE

## 2023-01-08 VITALS
BODY MASS INDEX: 19.35 KG/M2 | HEIGHT: 66 IN | OXYGEN SATURATION: 99 % | HEART RATE: 72 BPM | DIASTOLIC BLOOD PRESSURE: 51 MMHG | RESPIRATION RATE: 15 BRPM | SYSTOLIC BLOOD PRESSURE: 120 MMHG | TEMPERATURE: 97.6 F | WEIGHT: 120.37 LBS

## 2023-01-08 DIAGNOSIS — R11.2 NAUSEA AND VOMITING, UNSPECIFIED VOMITING TYPE: ICD-10-CM

## 2023-01-08 DIAGNOSIS — K59.00 CONSTIPATION, UNSPECIFIED CONSTIPATION TYPE: ICD-10-CM
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
## 2023-01-08 LAB
ALBUMIN SERPL-MCNC: 4 G/DL (ref 3.5–5.2)
ALBUMIN/GLOB SERPL: 1.3 (ref 1.1–2.2)
ALP SERPL-CCNC: 105 U/L (ref 40–129)
ALT SERPL-CCNC: 11 U/L (ref 10–50)
ANION GAP SERPL CALC-SCNC: 15 MMOL/L (ref 5–15)
AST SERPL-CCNC: 16 U/L (ref 10–50)
BASOPHILS # BLD: 0.1 K/UL (ref 0–1)
BASOPHILS NFR BLD: 1 % (ref 0–1)
BILIRUB SERPL-MCNC: 0.7 MG/DL (ref 0.2–1)
BUN SERPL-MCNC: 43 MG/DL (ref 8–23)
BUN/CREAT SERPL: 22 (ref 12–20)
CALCIUM SERPL-MCNC: 9 MG/DL (ref 8.8–10.2)
CHLORIDE SERPL-SCNC: 103 MMOL/L (ref 98–107)
CO2 SERPL-SCNC: 20 MMOL/L (ref 22–29)
CREAT SERPL-MCNC: 2 MG/DL (ref 0.7–1.2)
DIFFERENTIAL METHOD BLD: ABNORMAL
EOSINOPHIL # BLD: 0.2 K/UL (ref 0–0.4)
EOSINOPHIL NFR BLD: 2 % (ref 0–7)
ERYTHROCYTE [DISTWIDTH] IN BLOOD BY AUTOMATED COUNT: 12.9 % (ref 11.5–14.5)
GLOBULIN SER CALC-MCNC: 3.1 G/DL (ref 2–4)
GLUCOSE SERPL-MCNC: 333 MG/DL (ref 65–100)
HCT VFR BLD AUTO: 41.3 % (ref 36.6–50.3)
HGB BLD-MCNC: 14.6 G/DL (ref 12.1–17)
IMM GRANULOCYTES # BLD AUTO: 0.1 K/UL (ref 0–0.04)
IMM GRANULOCYTES NFR BLD AUTO: 1 % (ref 0–0.5)
LIPASE SERPL-CCNC: 110 U/L (ref 13–60)
LYMPHOCYTES # BLD: 1.9 K/UL (ref 0.8–3.5)
LYMPHOCYTES NFR BLD: 17 % (ref 12–49)
MAGNESIUM SERPL-MCNC: 1.7 MG/DL (ref 1.6–2.4)
MCH RBC QN AUTO: 31.6 PG (ref 26–34)
MCHC RBC AUTO-ENTMCNC: 35.4 G/DL (ref 30–36.5)
MCV RBC AUTO: 89.4 FL (ref 80–99)
MONOCYTES # BLD: 0.5 K/UL (ref 0–1)
MONOCYTES NFR BLD: 4 % (ref 5–13)
NEUTS SEG # BLD: 8.5 K/UL (ref 1.8–8)
NEUTS SEG NFR BLD: 75 % (ref 32–75)
NRBC # BLD: 0 K/UL (ref 0–0.01)
NRBC BLD-RTO: 0 PER 100 WBC
PLATELET # BLD AUTO: 216 K/UL (ref 150–400)
PMV BLD AUTO: 9.6 FL (ref 8.9–12.9)
POTASSIUM SERPL-SCNC: 4.1 MMOL/L (ref 3.5–5.1)
PROT SERPL-MCNC: 7.1 G/DL (ref 6.4–8.3)
RBC # BLD AUTO: 4.62 M/UL (ref 4.1–5.7)
SODIUM SERPL-SCNC: 138 MMOL/L (ref 136–145)
WBC # BLD AUTO: 11.2 K/UL (ref 4.1–11.1)

## 2023-01-08 PROCEDURE — 96361 HYDRATE IV INFUSION ADD-ON: CPT

## 2023-01-08 PROCEDURE — 74011250636 HC RX REV CODE- 250/636: Performed by: STUDENT IN AN ORGANIZED HEALTH CARE EDUCATION/TRAINING PROGRAM

## 2023-01-08 PROCEDURE — 83690 ASSAY OF LIPASE: CPT

## 2023-01-08 PROCEDURE — 36415 COLL VENOUS BLD VENIPUNCTURE: CPT

## 2023-01-08 PROCEDURE — 83735 ASSAY OF MAGNESIUM: CPT

## 2023-01-08 PROCEDURE — 70450 CT HEAD/BRAIN W/O DYE: CPT

## 2023-01-08 PROCEDURE — 85025 COMPLETE CBC W/AUTO DIFF WBC: CPT

## 2023-01-08 PROCEDURE — 93005 ELECTROCARDIOGRAM TRACING: CPT

## 2023-01-08 PROCEDURE — 99284 EMERGENCY DEPT VISIT MOD MDM: CPT

## 2023-01-08 PROCEDURE — 96374 THER/PROPH/DIAG INJ IV PUSH: CPT

## 2023-01-08 PROCEDURE — 74176 CT ABD & PELVIS W/O CONTRAST: CPT

## 2023-01-08 PROCEDURE — 80053 COMPREHEN METABOLIC PANEL: CPT

## 2023-01-08 RX ORDER — ONDANSETRON 2 MG/ML
4 INJECTION INTRAMUSCULAR; INTRAVENOUS
Status: COMPLETED | OUTPATIENT
Start: 2023-01-08 | End: 2023-01-08

## 2023-01-08 RX ORDER — GLYCERIN 1 G/1
1 SUPPOSITORY RECTAL
Qty: 12 SUPPOSITORY | Refills: 0 | Status: SHIPPED | OUTPATIENT
Start: 2023-01-08

## 2023-01-08 RX ORDER — MAGNESIUM CITRATE
296 SOLUTION, ORAL ORAL
Qty: 296 ML | Refills: 0 | Status: SHIPPED | OUTPATIENT
Start: 2023-01-08 | End: 2023-01-08

## 2023-01-08 RX ORDER — POLYETHYLENE GLYCOL 3350 17 G/17G
34 POWDER, FOR SOLUTION ORAL 2 TIMES DAILY
Qty: 2040 G | Refills: 0 | Status: SHIPPED | OUTPATIENT
Start: 2023-01-08 | End: 2023-02-07

## 2023-01-08 RX ADMIN — ONDANSETRON 4 MG: 2 INJECTION INTRAMUSCULAR; INTRAVENOUS at 02:21

## 2023-01-08 RX ADMIN — SODIUM CHLORIDE 1000 ML: 9 INJECTION, SOLUTION INTRAVENOUS at 01:48

## 2023-01-08 NOTE — ED NOTES
The pt report yesterday he woke up feeling \"sick\" and had 2 falls while on the toilet. The pt reports that he fell into the bathtub hitting the back of his head x 2. The pt reports that he has had Nausea with occasional vomiting. The pt reports constipation x several days and struggling to pass stool. The pt reports that he feels cold all the time. The pt reports that during some of the events the pt felt like he would pass out but believes that he did not. The pt reports that it is questionable. The pt reports that he lives at home with family.

## 2023-01-08 NOTE — DISCHARGE INSTRUCTIONS
You presented to ED with falls in the setting of constipation. Mild to moderate stool burden present on imaging in the colon. No signs of small bowel obstruction, pancreatitis on CT scan. Lab work generally within normal limits. Large bowel movement here in the ED with improvement in blood pressure generally feeling much better. I suspect you have had lightheadedness from the constipation vasovagal type syncope or near syncope. Recommend additional treatment for constipation as below recommend the following home treatments. 1.  MiraLAX cleanout. Take 8 capfuls of MiraLAX and put in 64 ounces of fluid. Drink 8 ounces every 15 minutes until gone. May repeat on day 2 if not getting good effects. 2.  If this is not working take magnesium citrate 1 bottle, on day 3.    3.  Additionally may try home enemas that are available over-the-counter from the pharmacy. 4.  Glycerin suppository prescription written, this may be used in conjunction with MiraLAX. 5.  In general increase water intake, fiber.     6.  For maintenance of normal bowel movements adjust daily MiraLAX dosing so you are having daily normal soft bowel movements

## 2023-01-08 NOTE — ED PROVIDER NOTES
Patient is a 75-year-old male with a complex medical history presenting to the ED with EMS after several days of constipation. Today patient had 2 falls associated with attempts of bowel movements. Sounds like vasovagal syncope. EMS denied head trauma but patient endorsed hitting the back of his head. Patient has active nausea and vomiting on arrival with moderate abdominal pain. Patient found to be hypothermic and some soft blood pressures but mentating fine. The history is provided by the patient, the EMS personnel and a relative. Fall  Associated symptoms include abdominal pain, nausea and vomiting. Constipation  Associated symptoms include abdominal pain. Pertinent negatives include no chest pain and no shortness of breath. Past Medical History:   Diagnosis Date    Arrhythmia     Atrial fibrillation. Arthritis     Bladder stones     Cancer ([facility] [address] 1999    bladder    Carotid atherosclerosis 2/22/2011    Chronic kidney disease     Chronic obstructive pulmonary disease (HCC)     Chronic pain     left shoulder--resolved after shoulder replacement    CKD (chronic kidney disease)     Stage 3    GERD (gastroesophageal reflux disease)     Hematuria     History of artificial bladder 8/7/2020    Hypercholesterolemia     Hyperlipidemia.     Hypertension     Ill-defined condition     hyperlipidemia    Incomplete emptying of bladder 8/7/2020    NIDDM (non-insulin dependent diabetes mellitus)     PAD (peripheral artery disease) ([facility])     Unspecified sleep apnea     non compliant with CPAP    Urinary tract infectious disease 8/7/2020       Past Surgical History:   Procedure Laterality Date    HX CATARACT REMOVAL Bilateral     HX CERVICAL FUSION  05/2015    C5-6    HX KNEE ARTHROSCOPY Left     repair torn meniscus    HX KNEE REPLACEMENT Left 02/2017    HX ORTHOPAEDIC Left     remove heel spur    HX OTHER SURGICAL Left 2011    remove benign growth on neck    HX OTHER SURGICAL      Excision of cyst with incision and drainage of abscess of the right upper back. HX PROSTATECTOMY      removed with bladder    HX SHOULDER REPLACEMENT Left 2012    HX UROLOGICAL      cystoscopy     HX UROLOGICAL  2020    cystourethroscopy     HX UROLOGICAL  2020    laser litholapaxy holmium    SD CSTC COMPL W/URTROILEAL CONDUIT/BLDR W/INT ANAST      SD UNLISTED PROCEDURE CARDIAC SURGERY      heart procedure     SD UNLISTED PROCEDURE VASCULAR SURGERY Bilateral     BLE stents         Family History:   Problem Relation Age of Onset    Heart Disease Mother     Cancer Father         rectal    Cancer Sister         colon    Diabetes Brother        Social History     Socioeconomic History    Marital status:      Spouse name: Not on file    Number of children: Not on file    Years of education: Not on file    Highest education level: Not on file   Occupational History    Not on file   Tobacco Use    Smoking status: Former     Years: 40.00     Types: Cigarettes     Quit date: 1999     Years since quittin.6    Smokeless tobacco: Never   Vaping Use    Vaping Use: Never used   Substance and Sexual Activity    Alcohol use: Not Currently    Drug use: Never    Sexual activity: Not on file   Other Topics Concern    Not on file   Social History Narrative    Not on file     Social Determinants of Health     Financial Resource Strain: Not on file   Food Insecurity: Not on file   Transportation Needs: Not on file   Physical Activity: Not on file   Stress: Not on file   Social Connections: Not on file   Intimate Partner Violence: Not on file   Housing Stability: Not on file         ALLERGIES: Patient has no known allergies. Review of Systems   Constitutional:  Positive for activity change, appetite change, diaphoresis and fatigue. Respiratory:  Negative for shortness of breath. Cardiovascular:  Negative for chest pain. Gastrointestinal:  Positive for abdominal pain, constipation, nausea and vomiting.    Neurological: Positive for syncope. Vitals:    01/08/23 0243 01/08/23 0258 01/08/23 0307 01/08/23 0343   BP: (!) 102/43 (!) 99/51  (!) 120/51   Pulse: (!) 56 (!) 55  72   Resp: 14 14  15   Temp:   97.6 °F (36.4 °C)    SpO2: 97% 97%  99%   Weight:       Height:                Physical Exam  Vitals and nursing note reviewed. Constitutional:       General: He is not in acute distress. Appearance: Normal appearance. He is ill-appearing. HENT:      Head: Normocephalic and atraumatic. Right Ear: External ear normal.      Left Ear: External ear normal.      Nose: Nose normal.   Eyes:      Conjunctiva/sclera: Conjunctivae normal.   Cardiovascular:      Rate and Rhythm: Normal rate and regular rhythm. Pulses: Normal pulses. Radial pulses are 2+ on the right side and 2+ on the left side. Heart sounds: Normal heart sounds. Pulmonary:      Effort: Pulmonary effort is normal.      Breath sounds: Normal breath sounds. Chest:      Chest wall: No deformity or tenderness. Abdominal:      General: There is no distension. Palpations: Abdomen is soft. Tenderness: There is abdominal tenderness. Musculoskeletal:         General: No deformity or signs of injury. Normal range of motion. Skin:     General: Skin is warm and dry. Capillary Refill: Capillary refill takes less than 2 seconds. Neurological:      General: No focal deficit present. Mental Status: He is alert and oriented to person, place, and time. Psychiatric:         Attention and Perception: Attention normal.         Mood and Affect: Mood normal.         Behavior: Behavior normal.        Medical Decision Making  Amount and/or Complexity of Data Reviewed  Labs: ordered. Radiology: ordered. ECG/medicine tests: ordered. Risk  OTC drugs. Prescription drug management.       ED Course as of 01/08/23 0423   Riana Larsen Jan 08, 2023   0140 Patient's EKG with junctional rhythm 57 bpm but likely due to poor tracing due to patient shivering and being slightly hypothermic. Intervals within normal limits, no concerning ST or T wave abnormalities. [AL]   [id number] Patient reassessed, resting comfortably. Abdominal pain is resolved. No nausea or vomiting. [AL]      ED Course User Index  [AL] Keyonna Munson MD     Social Determinants of Health    Financial Resource Strain: yes  Food Insecurity: no  Transportation Needs: yes  Stress: yes  Difficulty With Social Connections: no  Intimate Partner Violence: no  Housing Instability: no      LABORATORY RESULTS:  Labs Reviewed   CBC WITH AUTOMATED DIFF - Abnormal; Notable for the following components:       Result Value    WBC 11.2 (*)     MONOCYTES 4 (*)     IMMATURE GRANULOCYTES 1 (*)     ABS. NEUTROPHILS 8.5 (*)     ABS. IMM. GRANS. 0.1 (*)     All other components within normal limits   METABOLIC PANEL, COMPREHENSIVE - Abnormal; Notable for the following components:    CO2 20 (*)     Glucose 333 (*)     BUN 43 (*)     Creatinine 2.00 (*)     BUN/Creatinine ratio 22 (*)     eGFR 33 (*)     All other components within normal limits   LIPASE - Abnormal; Notable for the following components:    Lipase 110 (*)     All other components within normal limits   MAGNESIUM       IMAGING RESULTS:  CT HEAD WO CONT   Final Result   No acute intracranial abnormality. CT ABD PELV WO CONT   Final Result   1. Status post bladder section and neobladder formation. 2.  No acute intra-abdominal pathology is identified. 3.  Incidental findings including cholelithiasis.           MEDICATIONS GIVEN:  Medications   ondansetron (ZOFRAN) injection 4 mg (4 mg IntraVENous Given 1/8/23 0221)   sodium chloride 0.9 % bolus infusion 1,000 mL (0 mL IntraVENous IV Completed 1/8/23 0307)       Differential diagnosis: Abdominal pain, nausea and vomiting, constipation, vasovagal syncope, arrhythmia, abdominal pathology    ED physician interpretation of imaging: CT shows moderate stool burden, no obvious stool rishi  ED physician interpretation of EKG: No STEMI. See my interpretation EKG in ED course above. ED physician interpretation of laboratory results: Patient's lab work with mild elevation in lipase but not consistent with pancreatitis. Patient kidney function near baseline CKD. Leukocytosis likely stress response due to nausea and vomiting hyperglycemia without DKA. MDM: Patient is a 68-year-old male presented the ED with abdominal pain nausea vomiting hypothermia with unremarkable CT scans of the head and abdomen. Lab work without significant findings. Patient does have cholelithiasis but no signs of cholecystitis. No specific right upper quadrant pain concerning for biliary colic. Patient had 2 large bowel movements in the ED without difficulty and with improvement of patient's vital signs and overall feeling well. Suspect patient's presentation was due to constipation and vasovagal near syncope. IV fluids given, Zofran resolved nausea. Patient appearing much better. Patient discharged home with family with constipation recommendations. Further personalized recommendations for outpatient care as below. Key discharge instructions and summary of care: You presented to ED with falls in the setting of constipation. Mild to moderate stool burden present on imaging in the colon. No signs of small bowel obstruction, pancreatitis on CT scan. Lab work generally within normal limits. Large bowel movement here in the ED with improvement in blood pressure generally feeling much better. I suspect you have had lightheadedness from the constipation vasovagal type syncope or near syncope. Recommend additional treatment for constipation as below recommend the following home treatments. 1.  MiraLAX cleanout. Take 8 capfuls of MiraLAX and put in 64 ounces of fluid. Drink 8 ounces every 15 minutes until gone. May repeat on day 2 if not getting good effects.     2.  If this is not working take magnesium citrate 1 bottle, on day 3.    3.  Additionally may try home enemas that are available over-the-counter from the pharmacy. 4.  Glycerin suppository prescription written, this may be used in conjunction with MiraLAX. 5.  In general increase water intake, fiber. 6.  For maintenance of normal bowel movements adjust daily MiraLAX dosing so you are having daily normal soft bowel movements    The patient has been re-evaluated and feeling better. Patient is stable for discharge. All available radiology and laboratory results have been reviewed with patient and/or available family. Patient and/or family verbally conveyed their understanding and agreement of the patient's signs, symptoms, diagnosis, treatment and prognosis and additionally agree to follow-up as recommended in the discharge instructions or to return to the Emergency Department should their condition change or worsen prior to their follow-up appointment. All questions have been answered and patient and/or available family express understanding. IMPRESSION:  1. Fall, initial encounter    2. Nausea and vomiting, unspecified vomiting type    3. Constipation, unspecified constipation type        DISPOSITION: Discharged     Wagner Avendaño MD      Procedures

## 2023-01-08 NOTE — ED NOTES
Bedside brought to the pt's bedside. The pt was assisted onto the toilet. The pt had a large bowel movement and denies feeling like he would have a syncopal episode.

## 2023-01-08 NOTE — ED TRIAGE NOTES
The pt reports 2 falls last night hitting his head. The pt reports that he has trouble with constipation and while using the restroom tonight the pt had a vasovagal like experience the pt reports. The pt reports this morning he woke up had an episode of diarrhea and felt near syncopal. The pt denies any head pain at this time but had pain when the falls happened.

## 2023-01-08 NOTE — ED NOTES
The patient was discharged home by Anibal Hamilton MD in stable condition. The patient is alert and oriented, in no respiratory distress and discharge vital signs obtained. The patient's diagnosis, condition and treatment were explained. The patient expressed understanding. 5 prescriptions given. No work/school note given. A discharge plan has been developed. A  was not involved in the process. Aftercare instructions were given. Pt ambulatory out of the ED. Pt discharged from the ED with family.

## 2023-01-11 LAB
CALCULATED R AXIS, ECG10: 71 DEGREES
CALCULATED T AXIS, ECG11: 63 DEGREES
DIAGNOSIS, 93000: NORMAL
Q-T INTERVAL, ECG07: 464 MS
QRS DURATION, ECG06: 84 MS
QTC CALCULATION (BEZET), ECG08: 451 MS
VENTRICULAR RATE, ECG03: 57 BPM

## 2023-05-01 ENCOUNTER — HOSPITAL ENCOUNTER (EMERGENCY)
Age: 78
Discharge: HOME | End: 2023-05-01
Attending: EMERGENCY MEDICINE
Payer: MEDICARE

## 2023-05-01 ENCOUNTER — APPOINTMENT (OUTPATIENT)
Dept: CT IMAGING | Age: 78
End: 2023-05-01
Attending: EMERGENCY MEDICINE
Payer: MEDICARE

## 2023-05-01 VITALS
BODY MASS INDEX: 20.89 KG/M2 | HEIGHT: 66 IN | HEART RATE: 72 BPM | RESPIRATION RATE: 16 BRPM | WEIGHT: 130 LBS | DIASTOLIC BLOOD PRESSURE: 79 MMHG | TEMPERATURE: 97.7 F | SYSTOLIC BLOOD PRESSURE: 150 MMHG | OXYGEN SATURATION: 95 %

## 2023-05-01 DIAGNOSIS — M54.2 NECK PAIN: Primary | ICD-10-CM

## 2023-05-01 PROCEDURE — 72125 CT NECK SPINE W/O DYE: CPT

## 2023-05-01 PROCEDURE — 74011250637 HC RX REV CODE- 250/637: Performed by: EMERGENCY MEDICINE

## 2023-05-01 PROCEDURE — 99284 EMERGENCY DEPT VISIT MOD MDM: CPT

## 2023-05-01 RX ORDER — CYCLOBENZAPRINE HCL 10 MG
10 TABLET ORAL
Qty: 30 TABLET | Refills: 0 | Status: SHIPPED | OUTPATIENT
Start: 2023-05-01 | End: 2023-05-11

## 2023-05-01 RX ORDER — CYCLOBENZAPRINE HCL 10 MG
10 TABLET ORAL
Status: COMPLETED | OUTPATIENT
Start: 2023-05-01 | End: 2023-05-01

## 2023-05-01 RX ADMIN — CYCLOBENZAPRINE 10 MG: 10 TABLET, FILM COATED ORAL at 10:41
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
## 2023-05-01 NOTE — DISCHARGE INSTRUCTIONS
Return to the emergency department with any new or worsening symptoms. In the meantime please follow-up with your primary care doctor as well as [address] [place] [address].   Take the medications I am prescribing you as directed

## 2023-05-01 NOTE — ED PROVIDER NOTES
HPI   64-year-old male with a past medical history of A-fib, CKD, COPD, hyperlipidemia, hypertension, and peripheral arterial disease presenting to the emergency department due to neck pain. A month ago patient was on his property and a cat ran out in front of him. The patient did not see the cat and it caused him to fall forward. He thinks he may have hit his head and at that time developed right-sided neck pain. He has continued to have this pain since that time. It is made worse with movement especially to the right. It radiates down just medial to the shoulder blade. No numbness or weakness in the extremities. No headaches. Past Medical History:   Diagnosis Date    Arrhythmia     Atrial fibrillation. Arthritis     Bladder stones     Cancer ([facility] [address] 1999    bladder    Carotid atherosclerosis 2/22/2011    Chronic kidney disease     Chronic obstructive pulmonary disease (HCC)     Chronic pain     left shoulder--resolved after shoulder replacement    CKD (chronic kidney disease)     Stage 3    GERD (gastroesophageal reflux disease)     Hematuria     History of artificial bladder 8/7/2020    Hypercholesterolemia     Hyperlipidemia. Hypertension     Ill-defined condition     hyperlipidemia    Incomplete emptying of bladder 8/7/2020    NIDDM (non-insulin dependent diabetes mellitus)     PAD (peripheral artery disease) ([facility])     Unspecified sleep apnea     non compliant with CPAP    Urinary tract infectious disease 8/7/2020       Past Surgical History:   Procedure Laterality Date    HX CATARACT REMOVAL Bilateral     HX CERVICAL FUSION  05/2015    C5-6    HX KNEE ARTHROSCOPY Left     repair torn meniscus    HX KNEE REPLACEMENT Left 02/2017    HX ORTHOPAEDIC Left     remove heel spur    HX OTHER SURGICAL Left 2011    remove benign growth on neck    HX OTHER SURGICAL      Excision of cyst with incision and drainage of abscess of the right upper back.     HX PROSTATECTOMY  1999    removed with bladder    HX SHOULDER REPLACEMENT Left 2012    HX UROLOGICAL      cystoscopy     HX UROLOGICAL  2020    cystourethroscopy     HX UROLOGICAL  2020    laser litholapaxy holmium    MO CSTC COMPL W/URTROILEAL CONDUIT/BLDR W/INT ANAST      MO UNLISTED PROCEDURE CARDIAC SURGERY      heart procedure     MO UNLISTED PROCEDURE VASCULAR SURGERY Bilateral     BLE stents         Family History:   Problem Relation Age of Onset    Heart Disease Mother     Cancer Father         rectal    Cancer Sister         colon    Diabetes Brother        Social History     Socioeconomic History    Marital status:      Spouse name: Not on file    Number of children: Not on file    Years of education: Not on file    Highest education level: Not on file   Occupational History    Not on file   Tobacco Use    Smoking status: Former     Years: 40.00     Types: Cigarettes     Quit date: 1999     Years since quittin.0    Smokeless tobacco: Never   Vaping Use    Vaping Use: Never used   Substance and Sexual Activity    Alcohol use: Not Currently    Drug use: Never    Sexual activity: Not on file   Other Topics Concern    Not on file   Social History Narrative    Not on file     Social Determinants of Health     Financial Resource Strain: Not on file   Food Insecurity: Not on file   Transportation Needs: Not on file   Physical Activity: Not on file   Stress: Not on file   Social Connections: Not on file   Intimate Partner Violence: Not on file   Housing Stability: Not on file         ALLERGIES: Patient has no known allergies.     Review of Systems  All systems reviewed are negative unless otherwise documented in the HPI  Vitals:    23 1031   BP: (!) 150/79   Pulse: 72   Resp: 16   Temp: 97.7 °F (36.5 °C)   SpO2: 95%   Weight: 59 kg (130 lb)   Height: 5' 6\" (1.676 m)            Physical Exam  Constitutional:       Comments: Not acutely distressed or ill-appearing   HENT:      Mouth/Throat:      Comments: Moist mucous membranes  Eyes:      General: No scleral icterus. Extraocular Movements: Extraocular movements intact. Neck:      Comments: Tenderness on the right side of the neck posteriorly without reproducible midline cervical spine tenderness. No carotid bruits. Cardiovascular:      Rate and Rhythm: Normal rate and regular rhythm. Heart sounds: No murmur heard. Pulmonary:      Effort: Pulmonary effort is normal. No respiratory distress. Musculoskeletal:         General: No deformity. Normal range of motion. Skin:     General: Skin is warm and dry. Neurological:      Comments: Awake and alert. GCS 15.  5 out of 5 strength with all movements of the upper extremities and no sensory deficits appreciated in the arms bilaterally        Medical Decision Making  Amount and/or Complexity of Data Reviewed  Radiology: ordered. Risk  Prescription drug management. 64-year-old male presenting with the above chief complaint. Stable vitals. Neurologically intact on exam.  No midline tenderness but when the pain turns his head left or right he has pain that he localizes on the right side of his neck. Given his age CT of the cervical spine will be obtained. He will be given some Flexeril in the meantime. No acute abnormalities on the patient's CT scan. He will be prescribed some Flexeril going forward and will be given information to follow-up with [address] [place] [address]. He has no neurologic signs or symptoms to suggest he needs an MRI emergently at this time. Patient discharged in stable condition.        Procedures

## 2023-11-14 ENCOUNTER — TELEPHONE (OUTPATIENT)
Age: 78
End: 2023-11-14

## 2023-11-14 NOTE — TELEPHONE ENCOUNTER
Patient was last seen in 2020. New records faxed with re-referral (in records, not actual referral). Can we call pt to see if he would like to schedule with Dr. Ruth Riley? Next available is fine.

## 2023-11-20 PROBLEM — N21.0 BLADDER STONE: Status: ACTIVE | Noted: 2020-11-12

## 2023-11-20 PROBLEM — R31.29 MICROHEMATURIA: Status: RESOLVED | Noted: 2020-11-11 | Resolved: 2023-11-20

## 2023-11-20 PROBLEM — R33.9 INCOMPLETE EMPTYING OF BLADDER: Status: ACTIVE | Noted: 2020-08-07

## 2023-11-29 ENCOUNTER — OFFICE VISIT (OUTPATIENT)
Age: 78
End: 2023-11-29
Payer: MEDICARE

## 2023-11-29 VITALS
OXYGEN SATURATION: 100 % | HEART RATE: 77 BPM | SYSTOLIC BLOOD PRESSURE: 157 MMHG | HEIGHT: 66 IN | BODY MASS INDEX: 20.89 KG/M2 | DIASTOLIC BLOOD PRESSURE: 83 MMHG | TEMPERATURE: 97.8 F | WEIGHT: 130 LBS

## 2023-11-29 DIAGNOSIS — C67.9 MALIGNANT NEOPLASM OF URINARY BLADDER, UNSPECIFIED SITE (HCC): Primary | ICD-10-CM

## 2023-11-29 DIAGNOSIS — N21.0 BLADDER STONE: ICD-10-CM

## 2023-11-29 DIAGNOSIS — N18.30 STAGE 3 CHRONIC KIDNEY DISEASE, UNSPECIFIED WHETHER STAGE 3A OR 3B CKD (HCC): ICD-10-CM

## 2023-11-29 DIAGNOSIS — R33.9 INCOMPLETE EMPTYING OF BLADDER: ICD-10-CM

## 2023-11-29 DIAGNOSIS — N30.00 ACUTE CYSTITIS WITHOUT HEMATURIA: ICD-10-CM

## 2023-11-29 PROCEDURE — 3077F SYST BP >= 140 MM HG: CPT | Performed by: UROLOGY

## 2023-11-29 PROCEDURE — G8427 DOCREV CUR MEDS BY ELIG CLIN: HCPCS | Performed by: UROLOGY

## 2023-11-29 PROCEDURE — G8484 FLU IMMUNIZE NO ADMIN: HCPCS | Performed by: UROLOGY

## 2023-11-29 PROCEDURE — 81003 URINALYSIS AUTO W/O SCOPE: CPT | Performed by: UROLOGY

## 2023-11-29 PROCEDURE — 1123F ACP DISCUSS/DSCN MKR DOCD: CPT | Performed by: UROLOGY

## 2023-11-29 PROCEDURE — 1036F TOBACCO NON-USER: CPT | Performed by: UROLOGY

## 2023-11-29 PROCEDURE — 99214 OFFICE O/P EST MOD 30 MIN: CPT | Performed by: UROLOGY

## 2023-11-29 PROCEDURE — G8420 CALC BMI NORM PARAMETERS: HCPCS | Performed by: UROLOGY

## 2023-11-29 PROCEDURE — 51798 US URINE CAPACITY MEASURE: CPT | Performed by: UROLOGY

## 2023-11-29 PROCEDURE — 3079F DIAST BP 80-89 MM HG: CPT | Performed by: UROLOGY

## 2023-11-29 SDOH — SOCIAL STABILITY: SOCIAL NETWORK: ARE YOU MARRIED, WIDOWED, DIVORCED, SEPARATED, NEVER MARRIED, OR LIVING WITH A PARTNER?: WIDOWED

## 2023-11-29 SDOH — SOCIAL STABILITY: SOCIAL NETWORK
IN A TYPICAL WEEK, HOW MANY TIMES DO YOU TALK ON THE PHONE WITH FAMILY, FRIENDS, OR NEIGHBORS?: MORE THAN THREE TIMES A WEEK

## 2023-11-29 SDOH — SOCIAL STABILITY: SOCIAL NETWORK: HOW OFTEN DO YOU ATTEND CHURCH OR RELIGIOUS SERVICES?: NEVER

## 2023-11-29 SDOH — SOCIAL STABILITY: SOCIAL NETWORK: HOW OFTEN DO YOU GET TOGETHER WITH FRIENDS OR RELATIVES?: MORE THAN THREE TIMES A WEEK

## 2023-11-30 LAB
ALBUMIN SERPL-MCNC: 4.2 G/DL (ref 3.8–4.8)
ALBUMIN/GLOB SERPL: 1.6 {RATIO} (ref 1.2–2.2)
ALP SERPL-CCNC: 109 IU/L (ref 44–121)
ALT SERPL-CCNC: 6 IU/L (ref 0–44)
APPEARANCE UR: CLEAR
AST SERPL-CCNC: 10 IU/L (ref 0–40)
BACTERIA #/AREA URNS HPF: ABNORMAL /[HPF]
BASOPHILS # BLD AUTO: 0.1 X10E3/UL (ref 0–0.2)
BASOPHILS NFR BLD AUTO: 1 %
BILIRUB SERPL-MCNC: 0.7 MG/DL (ref 0–1.2)
BILIRUB UR QL STRIP: NEGATIVE
BUN SERPL-MCNC: 15 MG/DL (ref 8–27)
BUN/CREAT SERPL: 12 (ref 10–24)
CALCIUM SERPL-MCNC: 9 MG/DL (ref 8.6–10.2)
CASTS URNS QL MICRO: ABNORMAL /LPF
CHLORIDE SERPL-SCNC: 107 MMOL/L (ref 96–106)
CO2 SERPL-SCNC: 21 MMOL/L (ref 20–29)
COLOR UR: YELLOW
CREAT SERPL-MCNC: 1.21 MG/DL (ref 0.76–1.27)
EGFRCR SERPLBLD CKD-EPI 2021: 61 ML/MIN/1.73
EOSINOPHIL # BLD AUTO: 0.2 X10E3/UL (ref 0–0.4)
EOSINOPHIL NFR BLD AUTO: 3 %
EPI CELLS #/AREA URNS HPF: ABNORMAL /HPF (ref 0–10)
ERYTHROCYTE [DISTWIDTH] IN BLOOD BY AUTOMATED COUNT: 12.1 % (ref 11.6–15.4)
GLOBULIN SER CALC-MCNC: 2.7 G/DL (ref 1.5–4.5)
GLUCOSE SERPL-MCNC: 120 MG/DL (ref 70–99)
GLUCOSE UR QL STRIP: NEGATIVE
HCT VFR BLD AUTO: 41 % (ref 37.5–51)
HGB BLD-MCNC: 14.1 G/DL (ref 13–17.7)
HGB UR QL STRIP: ABNORMAL
IMM GRANULOCYTES # BLD AUTO: 0 X10E3/UL (ref 0–0.1)
IMM GRANULOCYTES NFR BLD AUTO: 0 %
KETONES UR QL STRIP: NEGATIVE
LEUKOCYTE ESTERASE UR QL STRIP: ABNORMAL
LYMPHOCYTES # BLD AUTO: 1.8 X10E3/UL (ref 0.7–3.1)
LYMPHOCYTES NFR BLD AUTO: 26 %
MCH RBC QN AUTO: 32.1 PG (ref 26.6–33)
MCHC RBC AUTO-ENTMCNC: 34.4 G/DL (ref 31.5–35.7)
MCV RBC AUTO: 93 FL (ref 79–97)
MICRO URNS: ABNORMAL
MONOCYTES # BLD AUTO: 0.3 X10E3/UL (ref 0.1–0.9)
MONOCYTES NFR BLD AUTO: 5 %
MUCOUS THREADS URNS QL MICRO: PRESENT
NEUTROPHILS # BLD AUTO: 4.4 X10E3/UL (ref 1.4–7)
NEUTROPHILS NFR BLD AUTO: 65 %
NITRITE UR QL STRIP: NEGATIVE
PH UR STRIP: 7 [PH] (ref 5–7.5)
PLATELET # BLD AUTO: 273 X10E3/UL (ref 150–450)
POTASSIUM SERPL-SCNC: 4.5 MMOL/L (ref 3.5–5.2)
PROT SERPL-MCNC: 6.9 G/DL (ref 6–8.5)
PROT UR QL STRIP: ABNORMAL
PSA SERPL-MCNC: <0.1 NG/ML (ref 0–4)
RBC # BLD AUTO: 4.39 X10E6/UL (ref 4.14–5.8)
RBC #/AREA URNS HPF: ABNORMAL /HPF (ref 0–2)
SODIUM SERPL-SCNC: 142 MMOL/L (ref 134–144)
SP GR UR STRIP: 1.01 (ref 1–1.03)
UROBILINOGEN UR STRIP-MCNC: 0.2 MG/DL (ref 0.2–1)
WBC # BLD AUTO: 6.8 X10E3/UL (ref 3.4–10.8)
WBC #/AREA URNS HPF: ABNORMAL /HPF (ref 0–5)

## 2023-12-01 LAB
BACTERIA UR CULT: NO GROWTH
BILIRUBIN, URINE, POC: NEGATIVE
BLOOD URINE, POC: NORMAL
GLUCOSE URINE, POC: NEGATIVE
KETONES, URINE, POC: NEGATIVE
LEUKOCYTE ESTERASE, URINE, POC: NEGATIVE
NITRITE, URINE, POC: NEGATIVE
PH, URINE, POC: 7 (ref 4.6–8)
PROTEIN,URINE, POC: NORMAL
PVR, POC: NORMAL CC
SPECIFIC GRAVITY, URINE, POC: 1.01 (ref 1–1.03)
URINALYSIS CLARITY, POC: CLEAR
URINALYSIS COLOR, POC: YELLOW
UROBILINOGEN, POC: NORMAL

## 2023-12-28 ENCOUNTER — HOSPITAL ENCOUNTER (OUTPATIENT)
Facility: HOSPITAL | Age: 78
Discharge: HOME | End: 2023-12-28
Attending: UROLOGY
Payer: MEDICARE

## 2023-12-28 DIAGNOSIS — C67.9 MALIGNANT NEOPLASM OF URINARY BLADDER, UNSPECIFIED SITE (HCC): ICD-10-CM

## 2023-12-28 PROCEDURE — 74176 CT ABD & PELVIS W/O CONTRAST: CPT

## 2025-03-27 ENCOUNTER — HOSPITAL ENCOUNTER (EMERGENCY)
Facility: HOSPITAL | Age: 80
Discharge: HOME | End: 2025-03-27
Attending: EMERGENCY MEDICINE
Payer: MEDICARE

## 2025-03-27 ENCOUNTER — APPOINTMENT (OUTPATIENT)
Facility: HOSPITAL | Age: 80
End: 2025-03-27
Payer: MEDICARE

## 2025-03-27 VITALS
HEART RATE: 67 BPM | WEIGHT: 162.04 LBS | DIASTOLIC BLOOD PRESSURE: 71 MMHG | OXYGEN SATURATION: 98 % | SYSTOLIC BLOOD PRESSURE: 154 MMHG | BODY MASS INDEX: 26.04 KG/M2 | RESPIRATION RATE: 18 BRPM | HEIGHT: 66 IN | TEMPERATURE: 97.8 F

## 2025-03-27 DIAGNOSIS — M25.512 ACUTE PAIN OF LEFT SHOULDER: Primary | ICD-10-CM

## 2025-03-27 PROCEDURE — 73030 X-RAY EXAM OF SHOULDER: CPT

## 2025-03-27 PROCEDURE — 99284 EMERGENCY DEPT VISIT MOD MDM: CPT

## 2025-03-27 PROCEDURE — 93005 ELECTROCARDIOGRAM TRACING: CPT

## 2025-03-27 PROCEDURE — 6370000000 HC RX 637 (ALT 250 FOR IP)

## 2025-03-27 RX ORDER — ACETAMINOPHEN 325 MG/1
650 TABLET ORAL
Status: COMPLETED | OUTPATIENT
Start: 2025-03-27 | End: 2025-03-27

## 2025-03-27 RX ORDER — LIDOCAINE 4 G/G
1 PATCH TOPICAL
Status: DISCONTINUED | OUTPATIENT
Start: 2025-03-27 | End: 2025-03-27 | Stop reason: HOSPADM

## 2025-03-27 RX ADMIN — ACETAMINOPHEN 650 MG: 325 TABLET ORAL at 13:03

## 2025-03-27 ASSESSMENT — PAIN SCALES - PAIN ASSESSMENT IN ADVANCED DEMENTIA (PAINAD)
CONSOLABILITY: NO NEED TO CONSOLE
TOTALSCORE: 0
FACIALEXPRESSION: SMILING OR INEXPRESSIVE
BREATHING: NORMAL
BODYLANGUAGE: RELAXED

## 2025-03-27 ASSESSMENT — PAIN DESCRIPTION - ORIENTATION: ORIENTATION: LEFT

## 2025-03-27 ASSESSMENT — LIFESTYLE VARIABLES
HOW MANY STANDARD DRINKS CONTAINING ALCOHOL DO YOU HAVE ON A TYPICAL DAY: PATIENT DOES NOT DRINK
HOW OFTEN DO YOU HAVE A DRINK CONTAINING ALCOHOL: NEVER

## 2025-03-27 ASSESSMENT — PAIN DESCRIPTION - DESCRIPTORS: DESCRIPTORS: ACHING

## 2025-03-27 ASSESSMENT — PAIN DESCRIPTION - LOCATION: LOCATION: SHOULDER

## 2025-03-27 ASSESSMENT — PAIN SCALES - GENERAL: PAINLEVEL_OUTOF10: 10

## 2025-03-27 ASSESSMENT — PAIN - FUNCTIONAL ASSESSMENT: PAIN_FUNCTIONAL_ASSESSMENT: PAIN ASSESSMENT IN ADVANCED DEMENTIA (PAINAD)

## 2025-03-27 NOTE — ED TRIAGE NOTES
Pt arrives to triage with family with c/o left arm and shoulder pain x2 days. Pain worse with movement. Per nephew, unknown if pt fell.     Denies chest pain, head pain.     Hx dementia. Alert to name, disoriented to time, place, situation. Per nephew, pt acting normal to baseline.

## 2025-03-27 NOTE — DISCHARGE INSTRUCTIONS
Please follow-up with primary care as well as the attached orthopedic group for further evaluation of your shoulder pain.  If symptoms change or worsen please do not hesitate to return to the ED.

## 2025-03-27 NOTE — ED PROVIDER NOTES
[facility]  EMERGENCY DEPARTMENT ENCOUNTER      Pt Name: Porfirio Cruz  MRN: [id number]  Birthdate 1945  Date of evaluation: 3/27/2025  Provider: Mandy Joe PA-C    CHIEF COMPLAINT       Chief Complaint   Patient presents with    Arm Pain         HISTORY OF PRESENT ILLNESS   (Location/Symptom, Timing/Onset, Context/Setting, Quality, Duration, Modifying Factors, Severity)  Note limiting factors.   79-year-old male history of hypertension, CKD, and dementia presenting with a son with concerns of left shoulder pain for the past 2 days.  Son states that he lives with his wife and himself and preforms ADLs without assistance.  They are unsure if he fell or had an injury.  Patient unable to report coherent history.  Say that it hurts worse with movement and is holding his shoulder a lot.  He is denying fevers, chest pain, shortness of breath, abdominal pain.    The history is provided by the patient and a relative. The history is limited by the condition of the patient (dementia).         Review of External Medical Records:     Nursing Notes were reviewed.    REVIEW OF SYSTEMS    (2-9 systems for level 4, 10 or more for level 5)     Review of Systems    Except as noted above the remainder of the review of systems was reviewed and negative.       PAST MEDICAL HISTORY     Past Medical History:   Diagnosis Date    Arrhythmia     Atrial fibrillation.    Arthritis     Bladder stones     Cancer (HCC) 1999    bladder    Carotid atherosclerosis 2/22/2011    Chronic kidney disease     Chronic obstructive pulmonary disease (HCC)     Chronic pain     left shoulder--resolved after shoulder replacement    CKD (chronic kidney disease)     Stage 3    GERD (gastroesophageal reflux disease)     Hematuria     History of artificial bladder 8/7/2020    Hypercholesterolemia     Hyperlipidemia.    Hypertension     Ill-defined condition     hyperlipidemia    Incomplete emptying of bladder 8/7/2020    PAD

## 2025-03-28 LAB
EKG DIAGNOSIS: NORMAL
EKG Q-T INTERVAL: 396 MS
EKG QRS DURATION: 86 MS
EKG QTC CALCULATION (BAZETT): 430 MS
EKG R AXIS: 62 DEGREES
EKG T AXIS: 55 DEGREES
EKG VENTRICULAR RATE: 71 BPM

## 2025-03-28 PROCEDURE — 93010 ELECTROCARDIOGRAM REPORT: CPT | Performed by: INTERNAL MEDICINE

## (undated) DEVICE — SUTURE ETHLN SZ 4-0 L18IN NONABSORBABLE BLK L19MM PS-2 3/8 1667H

## (undated) DEVICE — NEEDLE HYPO 22GA L1.5IN BLK S STL HUB POLYPR SHLD REG BVL

## (undated) DEVICE — REM POLYHESIVE ADULT PATIENT RETURN ELECTRODE: Brand: VALLEYLAB

## (undated) DEVICE — CYSTO PACK: Brand: MEDLINE INDUSTRIES, INC.

## (undated) DEVICE — SOL IRRIGATION INJ NACL 0.9% 500ML BTL

## (undated) DEVICE — DEVICE TRNSF SPIK STL 2008S] MICROTEK MEDICAL INC]

## (undated) DEVICE — SYR 10ML LUER LOK 1/5ML GRAD --

## (undated) DEVICE — GLOVE SURG 7 BIOGEL PI ULTRATOUCH G

## (undated) DEVICE — SUTURE MCRYL SZ 4-0 L18IN ABSRB UD L19MM PS-2 3/8 CIR PRIM Y496G

## (undated) DEVICE — GAUZE SPONGES,12 PLY: Brand: CURITY

## (undated) DEVICE — TUBING SUCTION 3/16 INX10 FT NS

## (undated) DEVICE — TOWEL SURG W17XL27IN STD BLU COT NONFENESTRATED PREWASHED

## (undated) DEVICE — DRAPE,REIN 53X77,STERILE: Brand: MEDLINE

## (undated) DEVICE — LIGHT HANDLE: Brand: DEVON

## (undated) DEVICE — SUTURE MCRYL SZ 4-0 L27IN ABSRB UD L19MM PS-2 1/2 CIR PRIM Y426H

## (undated) DEVICE — SUTURE VCRL SZ 3-0 L27IN ABSRB UD L26MM SH 1/2 CIR J416H

## (undated) DEVICE — SURGICAL PROCEDURE PACK BASIN MAJ SET CUST NO CAUT

## (undated) DEVICE — SOL IRR STRL H2O 3000ML BG -- USE ITEM 173640

## (undated) DEVICE — 3000CC GUARDIAN II: Brand: GUARDIAN

## (undated) DEVICE — SUTURE MCRYL SZ 3-0 L27IN ABSRB UD L26MM SH 1/2 CIR Y416H

## (undated) DEVICE — CHEST PACK: Brand: MEDLINE INDUSTRIES, INC.

## (undated) DEVICE — SOLUTION IRRG STRL H2O 500 ML BTL 16/CA

## (undated) DEVICE — LASER SURG FIBER MASTERPULSE

## (undated) DEVICE — (D)PREP SKN CHLRAPRP APPL 26ML -- CONVERT TO ITEM 371833

## (undated) DEVICE — SPONGE GZ 4X4 IN PRECUT SQ 8 PLY COTTON

## (undated) DEVICE — STERILE POLYISOPRENE POWDER-FREE SURGICAL GLOVES: Brand: PROTEXIS

## (undated) DEVICE — DEVON™ KNEE AND BODY STRAP 60" X 3" (1.5 M X 7.6 CM): Brand: DEVON

## (undated) DEVICE — ROCKER SWITCH PENCIL BLADE ELECTRODE, HOLSTER: Brand: EDGE

## (undated) DEVICE — SOLUTION IV 1000ML 0.9% SOD CHL

## (undated) DEVICE — DBD-PACK,LAPAROTOMY,2 REINFORCED GOWNS: Brand: MEDLINE

## (undated) DEVICE — INFECTION CONTROL KIT SYS

## (undated) DEVICE — KENDALL SCD EXPRESS SLEEVES, KNEE LENGTH, MEDIUM: Brand: KENDALL SCD

## (undated) DEVICE — BAG DRNGE W 8MM ADPT AND SUCT HOSE CYSTO UROLOGICAL FOR